# Patient Record
Sex: MALE | Race: WHITE | Employment: FULL TIME | URBAN - NONMETROPOLITAN AREA
[De-identification: names, ages, dates, MRNs, and addresses within clinical notes are randomized per-mention and may not be internally consistent; named-entity substitution may affect disease eponyms.]

---

## 2021-06-03 ENCOUNTER — OFFICE VISIT (OUTPATIENT)
Dept: PRIMARY CARE CLINIC | Age: 36
End: 2021-06-03
Payer: COMMERCIAL

## 2021-06-03 VITALS
HEART RATE: 74 BPM | HEIGHT: 72 IN | DIASTOLIC BLOOD PRESSURE: 78 MMHG | OXYGEN SATURATION: 98 % | TEMPERATURE: 97.3 F | BODY MASS INDEX: 30.51 KG/M2 | SYSTOLIC BLOOD PRESSURE: 118 MMHG | WEIGHT: 225.25 LBS

## 2021-06-03 DIAGNOSIS — N45.1 EPIDIDYMITIS: ICD-10-CM

## 2021-06-03 DIAGNOSIS — F41.9 ANXIETY AND DEPRESSION: ICD-10-CM

## 2021-06-03 DIAGNOSIS — G25.81 RESTLESS LEGS SYNDROME: ICD-10-CM

## 2021-06-03 DIAGNOSIS — F32.A ANXIETY AND DEPRESSION: ICD-10-CM

## 2021-06-03 DIAGNOSIS — Z00.00 VISIT FOR PREVENTIVE HEALTH EXAMINATION: ICD-10-CM

## 2021-06-03 DIAGNOSIS — Z11.59 ENCOUNTER FOR HEPATITIS C SCREENING TEST FOR LOW RISK PATIENT: ICD-10-CM

## 2021-06-03 DIAGNOSIS — Z13.220 SCREENING FOR LIPID DISORDERS: ICD-10-CM

## 2021-06-03 DIAGNOSIS — Z11.4 SCREENING FOR HIV (HUMAN IMMUNODEFICIENCY VIRUS): ICD-10-CM

## 2021-06-03 DIAGNOSIS — Z00.00 VISIT FOR PREVENTIVE HEALTH EXAMINATION: Primary | ICD-10-CM

## 2021-06-03 DIAGNOSIS — R53.83 FATIGUE, UNSPECIFIED TYPE: ICD-10-CM

## 2021-06-03 DIAGNOSIS — K21.9 GASTROESOPHAGEAL REFLUX DISEASE, UNSPECIFIED WHETHER ESOPHAGITIS PRESENT: ICD-10-CM

## 2021-06-03 LAB
ALBUMIN SERPL-MCNC: 4.4 G/DL (ref 3.5–5.2)
ALP BLD-CCNC: 72 U/L (ref 40–130)
ALT SERPL-CCNC: 26 U/L (ref 5–41)
ANION GAP SERPL CALCULATED.3IONS-SCNC: 12 MMOL/L (ref 7–19)
AST SERPL-CCNC: 28 U/L (ref 5–40)
BASOPHILS ABSOLUTE: 0 K/UL (ref 0–0.2)
BASOPHILS RELATIVE PERCENT: 0.3 % (ref 0–1)
BILIRUB SERPL-MCNC: 0.3 MG/DL (ref 0.2–1.2)
BUN BLDV-MCNC: 17 MG/DL (ref 6–20)
CALCIUM SERPL-MCNC: 9.9 MG/DL (ref 8.6–10)
CHLORIDE BLD-SCNC: 100 MMOL/L (ref 98–111)
CHOLESTEROL, TOTAL: 218 MG/DL (ref 160–199)
CO2: 24 MMOL/L (ref 22–29)
CREAT SERPL-MCNC: 1 MG/DL (ref 0.5–1.2)
EOSINOPHILS ABSOLUTE: 0.2 K/UL (ref 0–0.6)
EOSINOPHILS RELATIVE PERCENT: 2.7 % (ref 0–5)
GFR AFRICAN AMERICAN: >59
GFR NON-AFRICAN AMERICAN: >60
GLUCOSE BLD-MCNC: 97 MG/DL (ref 74–109)
HCT VFR BLD CALC: 47.5 % (ref 42–52)
HDLC SERPL-MCNC: 31 MG/DL (ref 55–121)
HEMOGLOBIN: 15.7 G/DL (ref 14–18)
HEPATITIS C ANTIBODY INTERPRETATION: NORMAL
HIV-1 P24 AG: NORMAL
IMMATURE GRANULOCYTES #: 0 K/UL
LDL CHOLESTEROL CALCULATED: 153 MG/DL
LYMPHOCYTES ABSOLUTE: 1.9 K/UL (ref 1.1–4.5)
LYMPHOCYTES RELATIVE PERCENT: 27.9 % (ref 20–40)
MCH RBC QN AUTO: 31 PG (ref 27–31)
MCHC RBC AUTO-ENTMCNC: 33.1 G/DL (ref 33–37)
MCV RBC AUTO: 93.9 FL (ref 80–94)
MONOCYTES ABSOLUTE: 0.5 K/UL (ref 0–0.9)
MONOCYTES RELATIVE PERCENT: 7.5 % (ref 0–10)
NEUTROPHILS ABSOLUTE: 4.1 K/UL (ref 1.5–7.5)
NEUTROPHILS RELATIVE PERCENT: 61.2 % (ref 50–65)
PDW BLD-RTO: 13.2 % (ref 11.5–14.5)
PLATELET # BLD: 225 K/UL (ref 130–400)
PMV BLD AUTO: 9.6 FL (ref 9.4–12.4)
POTASSIUM SERPL-SCNC: 5 MMOL/L (ref 3.5–5)
RAPID HIV 1&2: NORMAL
RBC # BLD: 5.06 M/UL (ref 4.7–6.1)
SODIUM BLD-SCNC: 136 MMOL/L (ref 136–145)
T4 FREE: 1.19 NG/DL (ref 0.93–1.7)
TOTAL PROTEIN: 7.6 G/DL (ref 6.6–8.7)
TRIGL SERPL-MCNC: 168 MG/DL (ref 0–149)
TSH SERPL DL<=0.05 MIU/L-ACNC: 0.89 UIU/ML (ref 0.27–4.2)
VITAMIN B-12: 536 PG/ML (ref 211–946)
WBC # BLD: 6.7 K/UL (ref 4.8–10.8)

## 2021-06-03 PROCEDURE — 99385 PREV VISIT NEW AGE 18-39: CPT | Performed by: PEDIATRICS

## 2021-06-03 RX ORDER — SERTRALINE HYDROCHLORIDE 100 MG/1
100 TABLET, FILM COATED ORAL DAILY
Qty: 90 TABLET | Refills: 3 | Status: SHIPPED | OUTPATIENT
Start: 2021-06-03 | End: 2021-07-02 | Stop reason: ALTCHOICE

## 2021-06-03 RX ORDER — CIPROFLOXACIN 500 MG/1
500 TABLET, FILM COATED ORAL 2 TIMES DAILY
Qty: 28 TABLET | Refills: 0 | Status: SHIPPED | OUTPATIENT
Start: 2021-06-03 | End: 2021-06-17

## 2021-06-03 RX ORDER — PANTOPRAZOLE SODIUM 40 MG/1
40 TABLET, DELAYED RELEASE ORAL DAILY
Qty: 90 TABLET | Refills: 3 | Status: SHIPPED | OUTPATIENT
Start: 2021-06-03 | End: 2022-08-01

## 2021-06-03 RX ORDER — SERTRALINE HYDROCHLORIDE 100 MG/1
1 TABLET, FILM COATED ORAL DAILY
COMMUNITY
Start: 2021-05-25 | End: 2021-06-03 | Stop reason: SDUPTHER

## 2021-06-03 RX ORDER — ROPINIROLE 0.5 MG/1
0.5 TABLET, FILM COATED ORAL 3 TIMES DAILY
Qty: 90 TABLET | Refills: 3 | Status: SHIPPED | OUTPATIENT
Start: 2021-06-03 | End: 2021-07-02 | Stop reason: ALTCHOICE

## 2021-06-03 RX ORDER — BUSPIRONE HYDROCHLORIDE 10 MG/1
1 TABLET ORAL 2 TIMES DAILY
COMMUNITY
Start: 2021-05-11 | End: 2021-06-03 | Stop reason: SDUPTHER

## 2021-06-03 RX ORDER — BUSPIRONE HYDROCHLORIDE 10 MG/1
10 TABLET ORAL 2 TIMES DAILY
Qty: 180 TABLET | Refills: 3 | Status: SHIPPED | OUTPATIENT
Start: 2021-06-03 | End: 2022-05-31

## 2021-06-03 RX ORDER — PANTOPRAZOLE SODIUM 40 MG/1
1 TABLET, DELAYED RELEASE ORAL DAILY
COMMUNITY
Start: 2021-04-06 | End: 2021-06-03 | Stop reason: SDUPTHER

## 2021-06-03 ASSESSMENT — PATIENT HEALTH QUESTIONNAIRE - PHQ9
SUM OF ALL RESPONSES TO PHQ9 QUESTIONS 1 & 2: 2
2. FEELING DOWN, DEPRESSED OR HOPELESS: 1
SUM OF ALL RESPONSES TO PHQ QUESTIONS 1-9: 2
1. LITTLE INTEREST OR PLEASURE IN DOING THINGS: 1
SUM OF ALL RESPONSES TO PHQ QUESTIONS 1-9: 2
SUM OF ALL RESPONSES TO PHQ QUESTIONS 1-9: 2

## 2021-06-03 ASSESSMENT — ENCOUNTER SYMPTOMS
SHORTNESS OF BREATH: 0
WHEEZING: 0
NAUSEA: 0
DIARRHEA: 0
ALLERGIC/IMMUNOLOGIC NEGATIVE: 1
CHEST TIGHTNESS: 0
RHINORRHEA: 0
BACK PAIN: 0
SINUS PRESSURE: 0
SORE THROAT: 0
ROS SKIN COMMENTS: NO NEW OR SUSPICIOUS SKIN LESIONS
COUGH: 0
CONSTIPATION: 0
VOMITING: 0
ABDOMINAL PAIN: 0
TROUBLE SWALLOWING: 0

## 2021-06-03 NOTE — PROGRESS NOTES
[address] [facility], [address]  Phone [phone]   Fax [phone]      OFFICE VISIT: 6/3/2021    Edgardo Mane-: 1985      HPI  Reason For Visit:  Siena Ramirez is a 38 y.o. 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 ), Mass (bumps in left underarm and lower left abdomin, no pain. He noticed the one on his arm several years ago, abdomin within the last year. ), and Medication Refill (zoloft, protonix, buspar)    Patient presents to [facility]. Present concerns:  He is refills of several medications. He also notes he has several bumps in his left axilla and left lower abdominal area. These been present for several years and have been stable but they seem to be increasing in the number of them present. These are not painful  These are soft and rubbery. They are located in the subcutaneous tissue      Anxiety and depression:  Medication:   Zoloft 100 mg daily   BuSpar 10 mg twice daily as needed  Symptoms: He feels that this regimen is relatively effective at managing his anxiety and depression symptoms. He would like to continue the same regimen      GERD:  Medication:   Protonix 40 mg daily  Symptoms: He is pleased with the Protonix and has not had any acid reflux symptoms      He has not had a physical in a couple yrs. He is due for a physical exam today      He is trying to lose weight. He is already lost about 50 pounds over the past several months. He has been dieting using a high-protein low carbohydrate diet. He has not been exercising      He is very tired all the time. He has daytime hypersomnolence. He is a restless sleeper. He has restless legs. He does not feel refreshed in the morning. He does snore some. height is 6' (1.829 m) and weight is 225 lb 4 oz (102.2 kg). His temporal temperature is 97.3 °F (36.3 °C). His blood pressure is 118/78 and his pulse is 74. His oxygen saturation is 98%.       Body mass index is 30.55 kg/m².    I have reviewed the following with the Mr. Mane   Lab Review  No results found for any previous visit. Copies of these are in the chart. Current Outpatient Medications   Medication Sig Dispense Refill    busPIRone (BUSPAR) 10 MG tablet Take 1 tablet by mouth 2 times daily 180 tablet 3    sertraline (ZOLOFT) 100 MG tablet Take 1 tablet by mouth daily 90 tablet 3    pantoprazole (PROTONIX) 40 MG tablet Take 1 tablet by mouth daily 90 tablet 3    rOPINIRole (REQUIP) 0.5 MG tablet Take 1 tablet by mouth 3 times daily 90 tablet 3    ciprofloxacin (CIPRO) 500 MG tablet Take 1 tablet by mouth 2 times daily for 14 days 28 tablet 0     No current facility-administered medications for this visit. Allergies: Patient has no known allergies. History reviewed. No pertinent past medical history. Family History   Problem Relation Age of Onset    Colon Cancer Paternal Grandmother        History reviewed. No pertinent surgical history. Social History     Tobacco Use    Smoking status: Never Smoker    Smokeless tobacco: Never Used   Substance Use Topics    Alcohol use: Not Currently        Review of Systems   Constitutional: Negative for appetite change, chills, diaphoresis, fatigue, fever and unexpected weight change. HENT: Positive for congestion. Negative for dental problem (following with dentist regularly), ear pain, hearing loss, postnasal drip, rhinorrhea, sinus pressure, sore throat, tinnitus and trouble swallowing. Eyes: Negative for visual disturbance (following with regular eye exams). Respiratory: Negative for cough, chest tightness, shortness of breath and wheezing. No orthopnea   Cardiovascular: Negative for chest pain (or pressure), palpitations and leg swelling. Gastrointestinal: Negative for abdominal pain, constipation, diarrhea, nausea and vomiting.         No melena or hematochezia   Endocrine: Negative for cold intolerance, heat intolerance, polydipsia and polyuria. Genitourinary: Negative for difficulty urinating, dysuria and enuresis. Musculoskeletal: Negative for arthralgias, back pain, joint swelling and myalgias. Skin: Negative for rash. No new or suspicious skin lesions   Allergic/Immunologic: Negative. Neurological: Negative for dizziness, tremors, syncope (or presyncope), weakness, light-headedness and headaches. Hematological: Negative for adenopathy. Does not bruise/bleed easily. Psychiatric/Behavioral: Negative. Physical Exam  Constitutional:       General: He is not in acute distress. Appearance: Normal appearance. He is well-developed. Comments: Mild ob   HENT:      Head: Normocephalic and atraumatic. Right Ear: Tympanic membrane, ear canal and external ear normal.      Left Ear: Tympanic membrane, ear canal and external ear normal.      Nose: Nose normal.      Mouth/Throat:      Mouth: Mucous membranes are moist.      Pharynx: Oropharynx is clear. Eyes:      General: No scleral icterus. Extraocular Movements: Extraocular movements intact. Conjunctiva/sclera: Conjunctivae normal.      Pupils: Pupils are equal, round, and reactive to light. Neck:      Thyroid: No thyromegaly. Vascular: No carotid bruit or JVD. Cardiovascular:      Rate and Rhythm: Normal rate and regular rhythm. No extrasystoles are present. Chest Wall: PMI is not displaced. Pulses: Normal pulses. Heart sounds: Normal heart sounds, S1 normal and S2 normal. No murmur heard. No friction rub. No gallop. Pulmonary:      Effort: Pulmonary effort is normal. No respiratory distress. Breath sounds: Normal breath sounds. No wheezing, rhonchi or rales. Abdominal:      General: Bowel sounds are normal.      Palpations: Abdomen is soft. Tenderness: There is no abdominal tenderness. There is no guarding or rebound.    Genitourinary:     Comments: Exam deferred  Musculoskeletal:         General: No tenderness. Normal range of motion. Cervical back: Normal range of motion and neck supple. Lymphadenopathy:      Cervical: No cervical adenopathy. Skin:     General: Skin is warm and dry. Capillary Refill: Capillary refill takes less than 2 seconds. Findings: No rash. Neurological:      General: No focal deficit present. Mental Status: He is alert and oriented to person, place, and time. Sensory: No sensory deficit (no numbness or tingling). Psychiatric:         Mood and Affect: Mood normal.         Behavior: Behavior normal.         ASSESSMENT      ICD-10-CM    1. Visit for preventive health examination  Z00.00 CBC Auto Differential     Comprehensive Metabolic Panel     Lipid Panel     T4, Free     TSH without Reflex     Hepatitis C Antibody     HIV Rapid 1&2   2. Anxiety and depression  F41.9 CBC Auto Differential    F32.9 Comprehensive Metabolic Panel     T4, Free     TSH without Reflex     busPIRone (BUSPAR) 10 MG tablet     sertraline (ZOLOFT) 100 MG tablet   3. Gastroesophageal reflux disease, unspecified whether esophagitis present  K21.9 CBC Auto Differential     Comprehensive Metabolic Panel     pantoprazole (PROTONIX) 40 MG tablet   4. Screening for HIV (human immunodeficiency virus)  Z11.4 HIV Rapid 1&2   5. Encounter for hepatitis C screening test for low risk patient  Z11.59 Hepatitis C Antibody   6. Screening for lipid disorders  Z13.220 Lipid Panel   7. Restless legs syndrome  G25.81 rOPINIRole (REQUIP) 0.5 MG tablet   8. Epididymitis  N45.1 ciprofloxacin (CIPRO) 500 MG tablet   9. Fatigue, unspecified type  R53.83 T4, Free     TSH without Reflex     Vitamin B12         PLAN    1. Visit for preventive health examination  Routine age-appropriate anticipatory guidance was provided. Annual wellness visit was performed and issues were addressed as identified.   - CBC Auto Differential; Future  - Comprehensive Metabolic Panel; Future  - Lipid Panel;  Future  - T4, Free; Future  - TSH without Reflex; Future  - Hepatitis C Antibody; Future  - HIV Rapid 1&2; Future    2. Anxiety and depression  We will continue with his Zoloft and BuSpar as before. He does need refills of these medications today  - CBC Auto Differential; Future  - Comprehensive Metabolic Panel; Future  - T4, Free; Future  - TSH without Reflex; Future  - busPIRone (BUSPAR) 10 MG tablet; Take 1 tablet by mouth 2 times daily  Dispense: 180 tablet; Refill: 3  - sertraline (ZOLOFT) 100 MG tablet; Take 1 tablet by mouth daily  Dispense: 90 tablet; Refill: 3    3. Gastroesophageal reflux disease, unspecified whether esophagitis present  Well-controlled with Protonix. We will continue this medication as well  - CBC Auto Differential; Future  - Comprehensive Metabolic Panel; Future  - pantoprazole (PROTONIX) 40 MG tablet; Take 1 tablet by mouth daily  Dispense: 90 tablet; Refill: 3    4. Screening for HIV (human immunodeficiency virus)  Routine screening  Procedure and rationale for the procedure was explained  - HIV Rapid 1&2; Future    5. Encounter for hepatitis C screening test for low risk patient  Routine screening. Procedure and rationale for the procedure was explained  - Hepatitis C Antibody; Future    6. Screening for lipid disorders  Check lipids as baseline  - Lipid Panel; Future    7. Restless legs syndrome  We will treat him for restless leg syndrome. Should he not benefit from this medication regimen, we may consider doing a sleep study  - rOPINIRole (REQUIP) 0.5 MG tablet; Take 1 tablet by mouth 3 times daily  Dispense: 90 tablet; Refill: 3    8. Epididymitis  This was suspected as an etiology however he did not have as significant of tenderness as I would have anticipated. We can treat this as an optional treatment. If symptoms do not improve I would go ahead and take the medication. - ciprofloxacin (CIPRO) 500 MG tablet; Take 1 tablet by mouth 2 times daily for 14 days  Dispense: 28 tablet;  Refill:

## 2021-06-03 NOTE — PATIENT INSTRUCTIONS
Patient Education        Learning About Anxiety Disorders  What are anxiety disorders? Anxiety disorders are a type of medical problem. They cause severe anxiety. When you feel anxious, you feel that something bad is about to happen. This feeling interferes with your life. These disorders include:  · Generalized anxiety disorder. You feel worried and stressed about many everyday events and activities. This goes on for several months and disrupts your life on most days. · Panic disorder. You have repeated panic attacks. A panic attack is a sudden, intense fear or anxiety. It may make you feel short of breath. Your heart may pound. · Social anxiety disorder. You feel very anxious about what you will say or do in front of people. For example, you may be scared to talk or eat in public. This problem affects your daily life. · Phobias. You are very scared of a specific object, situation, or activity. For example, you may fear spiders, high places, or small spaces. What are the symptoms? Generalized anxiety disorder  Symptoms may include:  · Feeling worried and stressed about many things almost every day. · Feeling tired or irritable. You may have a hard time concentrating. · Having headaches or muscle aches. · Having a hard time getting to sleep or staying asleep. Panic disorder  You may have repeated panic attacks when there is no reason for feeling afraid. You may change your daily activities because you worry that you will have another attack. Symptoms may include:  · Intense fear, terror, or anxiety. · Trouble breathing or very fast breathing. · Chest pain or tightness. · A heartbeat that races or is not regular. Social anxiety disorder  Symptoms may include:  · Fear about a social situation, such as eating in front of others or speaking in public. You may worry a lot. Or you may be afraid that something bad will happen. · Anxiety that can cause you to blush, sweat, and feel shaky.   · A heartbeat that is faster than normal.  · A hard time focusing. Phobias  Symptoms may include:  · More fear than most people of being around an object, being in a situation, or doing an activity. You might also be stressed about the chance of being around the thing you fear. · Worry about losing control, panicking, fainting, or having physical symptoms like a faster heartbeat when you are around the situation or object. How are these disorders treated? Anxiety disorders can be treated with medicines or counseling. A combination of both may be used. Medicines may include:  · Antidepressants. These may help your symptoms by keeping chemicals in your brain in balance. · Benzodiazepines. These may give you short-term relief of your symptoms. Some people use cognitive-behavioral therapy. A therapist helps you learn to change stressful or bad thoughts into helpful thoughts. Lead a healthy lifestyle  A healthy lifestyle may help you feel better. · Get at least 30 minutes of exercise on most days of the week. Walking is a good choice. · Eat a healthy diet. Include fruits, vegetables, lean proteins, and whole grains in your diet each day. · Try to go to bed at the same time every night. Try for 8 hours of sleep a night. · Find ways to manage stress. Try relaxation exercises. · Avoid alcohol and illegal drugs. Follow-up care is a key part of your treatment and safety. Be sure to make and go to all appointments, and call your doctor if you are having problems. It's also a good idea to know your test results and keep a list of the medicines you take. Where can you learn more? Go to https://christiano.[employer]. org and sign in to your WireImage account. Enter D103 in the [employer]Rocket box to learn more about \"Learning About Anxiety Disorders. \"     If you do not have an account, please click on the \"Sign Up Now\" link.   Current as of: September 23, 2020               Content Version: 12.8  © as antidepressants, antihistamines, and cold and sinus medicines, can make your symptoms worse. · Avoid caffeine products, such as coffee, tea, cola, and chocolate. Caffeine can interrupt your sleep and stimulate you. · Do not smoke. Nicotine can make restless legs worse. If you need help quitting, talk to your doctor about stop-smoking programs and medicines. These can increase your chances of quitting for good. · Do not drink alcohol late in the evening. Take steps to help you sleep better  · Get plenty of sunlight in the outdoors, particularly later in the afternoon. · Use the evening hours for settling down. Avoid activities that challenge you in the hours before bedtime. · Eat meals at regular times, and do not snack before bedtime. · Keep your bedroom quiet, dark, and cool. Try using a sleep mask and earplugs to help you sleep. · Limit how much you drink at night to reduce your need to get up to urinate. But do not go to bed thirsty. · Run a fan or other steady \"white noise\" during the night if noises wake you up. · Simpson the bed for sleeping and sex. Do your reading or TV watching in another room. · Once you are in bed, relax from head to toe, and guide your mind to pleasant thoughts. · Do not stay in bed longer than 8 hours, and try to avoid naps. When should you call for help? Watch closely for changes in your health, and be sure to contact your doctor if:    · You are still not getting enough sleep.     · Your symptoms become more severe or happen more often. Where can you learn more? Go to https://Jiraferenato.Meeps. org and sign in to your LifeShield Security account. Enter G159 in the [facility] box to learn more about \"Restless Legs Syndrome: Care Instructions. \"     If you do not have an account, please click on the \"Sign Up Now\" link. Current as of: August 4, 2020               Content Version: 12.8  © 7315-9870 Healthwise, Incorporated.    Care instructions adapted under license by [facility] ([facility]). If you have questions about a medical condition or this instruction, always ask your healthcare professional. [address] any warranty or liability for your use of this information. Patient Education        Sleep Apnea: Care Instructions  Overview     Sleep apnea means that you frequently stop breathing for 10 seconds or longer during sleep. It can be mild to severe, based on the number of times an hour that you stop breathing or have slowed breathing. Blocked or narrowed airways in your nose, mouth, or throat can cause sleep apnea. Your airway can become blocked when your throat muscles and tongue relax during sleep. You can help treat sleep apnea at home by making lifestyle changes. You also can use a CPAP breathing machine that keeps tissues in the throat from blocking your airway. Or your doctor may suggest that you use a breathing device while you sleep. It helps keep your airway open. This could be a device that you put in your mouth. In some cases, surgery may be needed to remove enlarged tissues in the throat. Follow-up care is a key part of your treatment and safety. Be sure to make and go to all appointments, and call your doctor if you are having problems. It's also a good idea to know your test results and keep a list of the medicines you take. How can you care for yourself at home? · Lose weight, if needed. · Sleep on your side. It may help mild apnea. · Avoid alcohol and medicines such as sleeping pills, opioids, or sedatives before bed. · Don't smoke. If you need help quitting, talk to your doctor. · Prop up the head of your bed. · Treat breathing problems, such as a stuffy nose, that are caused by a cold or allergies. · Try a continuous positive airway pressure (CPAP) breathing machine if your doctor recommends it. · If CPAP doesn't work for you, ask your doctor if you can try other masks, settings, or breathing machines.   · Try oral or more times an hour (severe apnea). Obstructive sleep apnea is the most common type. This most often occurs because your airways are blocked or partly blocked. Central sleep apnea is less common. It happens when the brain has trouble controlling breathing. Some people have both types. That's called complex sleep apnea. What are the symptoms? There are symptoms of sleep apnea that you may notice and symptoms that others may notice when you're asleep. Symptoms you may notice include:  · Feeling extremely sleepy during the day. · Feeling unrefreshed or tired after a night's sleep. · Problems with memory and concentration, or mood changes. · Morning headaches. · Getting up often during the night to urinate. · A dry mouth or sore throat in the morning. Your bed partner may notice that you:  · Have episodes of not breathing. · Snore loudly. Almost all people who have sleep apnea snore. But not all people who snore have sleep apnea. · Toss and turn during sleep. · Have nighttime choking or gasping spells. How is it diagnosed? Your doctor will probably do a physical exam and ask about your past health. The doctor may also ask you or your bed partner about your snoring and sleep behavior and how tired you feel during the day. Your doctor may suggest a sleep study. Sleep studies are a series of tests that look at what happens to the body during sleep. They check for how often you stop breathing or have too little air flowing into your lungs during sleep. They also find out how much oxygen you have in your blood during sleep. A sleep study may take place in your home. Or it might take place at a sleep center, where you will spend the night. If your sleep apnea doesn't improve with treatment, you may have more tests to find out what's causing it. How is it treated? You may be able to help treat sleep apnea by making some lifestyle changes.  You could try to lose weight, sleep on your side, and avoid alcohol and medicines like sedatives before bed. Sleep apnea is often treated with machines that deliver air through a mask to help keep your airways open. These include:  · Continuous positive airway pressure (CPAP). This increases air pressure in your throat. It keeps your airway open when you breathe in. It's the most common device. · Bilevel positive airway pressure (BiPAP). This uses different air pressures when you breathe in and out. · Adaptive servo ventilation (ASV). It senses pauses in breathing and adjusts air pressure. It's mostly used for central sleep apnea. You can also try oral breathing devices or nasal devices. If your tonsils or other tissues are blocking your airway, your doctor may suggest surgery to open the airway. How can you care for yourself at home? · Lose weight, if needed. · Sleep on your side. It may help mild apnea. · Avoid alcohol and medicines such as sleeping pills, opioids, or sedatives before bed. · Don't smoke. If you need help quitting, talk to your doctor. · Prop up the head of your bed. · Treat breathing problems, such as a stuffy nose, that are caused by a cold or allergies. · Try a continuous positive airway pressure (CPAP) breathing machine if your doctor recommends it. · If CPAP doesn't work for you, ask your doctor if you can try other masks, settings, or breathing machines. · Try oral breathing devices or other nasal devices. · Talk to your doctor if your nose feels dry or bleeds, or if it gets runny or stuffy when you use a breathing machine. · Tell your doctor if you're sleepy during the day and it affects your daily life. Don't drive or operate machinery when you're drowsy. Where can you learn more? Go to https://Hot Hotels.[employer]. org and sign in to your Xray Im[employer]k account. Enter S121 in the [employer] box to learn more about \"Learning About Sleep Apnea. \"     If you do not have an account, please click on the \"Sign Up Now\" link.   Current as of: October 26, 2020               Content Version: 12.8  © 1569-0411 Healthwise, Incorporated. Care instructions adapted under license by [facility] ([facility]). If you have questions about a medical condition or this instruction, always ask your healthcare professional. [address] any warranty or liability for your use of this information.

## 2021-06-07 ENCOUNTER — TELEPHONE (OUTPATIENT)
Dept: PRIMARY CARE CLINIC | Age: 36
End: 2021-06-07

## 2021-06-07 DIAGNOSIS — E78.2 MIXED HYPERLIPIDEMIA: Primary | ICD-10-CM

## 2021-06-07 RX ORDER — ATORVASTATIN CALCIUM 20 MG/1
20 TABLET, FILM COATED ORAL DAILY
Qty: 30 TABLET | Refills: 5 | Status: SHIPPED | OUTPATIENT
Start: 2021-06-07 | End: 2021-11-29

## 2021-06-07 NOTE — TELEPHONE ENCOUNTER
----- Message from Shanon Gilman DO sent at 6/4/2021  7:06 PM CDT -----  Your metabolic profile is normal.  This includes kidney and liver functions as well as electrolytes. Lipids are significantly elevated. This is likely higher than what you can get down by diet and exercise alone. Recommend atorvastatin 20 mg nightly. Dispense #30 with 11 refills. Recheck lipids and ALT in 4 to 6 weeks. HIV is nonreactive, meaning normal.Thyroid values normal.Vitamin B12 level is normal.Hep C antibody is nonreactive, meaning normal.Your WBC, (infection fighting ability) Hgb and Hct, (oxygen carrying cells) are normal; as is your percentage of each cell type.

## 2021-07-02 ENCOUNTER — OFFICE VISIT (OUTPATIENT)
Dept: PRIMARY CARE CLINIC | Age: 36
End: 2021-07-02
Payer: COMMERCIAL

## 2021-07-02 VITALS
SYSTOLIC BLOOD PRESSURE: 96 MMHG | TEMPERATURE: 97.3 F | HEART RATE: 74 BPM | OXYGEN SATURATION: 98 % | HEIGHT: 72 IN | BODY MASS INDEX: 29.39 KG/M2 | DIASTOLIC BLOOD PRESSURE: 66 MMHG | WEIGHT: 217 LBS

## 2021-07-02 DIAGNOSIS — I95.1 ORTHOSTASIS: ICD-10-CM

## 2021-07-02 DIAGNOSIS — G47.19 DAYTIME HYPERSOMNOLENCE: ICD-10-CM

## 2021-07-02 DIAGNOSIS — F33.1 MODERATE EPISODE OF RECURRENT MAJOR DEPRESSIVE DISORDER (HCC): ICD-10-CM

## 2021-07-02 DIAGNOSIS — F41.9 ANXIETY: Primary | ICD-10-CM

## 2021-07-02 DIAGNOSIS — E78.2 MIXED HYPERLIPIDEMIA: ICD-10-CM

## 2021-07-02 PROCEDURE — 99214 OFFICE O/P EST MOD 30 MIN: CPT | Performed by: PEDIATRICS

## 2021-07-02 RX ORDER — FLUOXETINE HYDROCHLORIDE 40 MG/1
40 CAPSULE ORAL DAILY
Qty: 30 CAPSULE | Refills: 3 | Status: SHIPPED | OUTPATIENT
Start: 2021-07-02 | End: 2021-08-06 | Stop reason: SDUPTHER

## 2021-07-02 SDOH — ECONOMIC STABILITY: FOOD INSECURITY: WITHIN THE PAST 12 MONTHS, YOU WORRIED THAT YOUR FOOD WOULD RUN OUT BEFORE YOU GOT MONEY TO BUY MORE.: NEVER TRUE

## 2021-07-02 SDOH — ECONOMIC STABILITY: FOOD INSECURITY: WITHIN THE PAST 12 MONTHS, THE FOOD YOU BOUGHT JUST DIDN'T LAST AND YOU DIDN'T HAVE MONEY TO GET MORE.: NEVER TRUE

## 2021-07-02 ASSESSMENT — ENCOUNTER SYMPTOMS
TROUBLE SWALLOWING: 0
DIARRHEA: 0
VOMITING: 0
RHINORRHEA: 0
WHEEZING: 0
ALLERGIC/IMMUNOLOGIC NEGATIVE: 1
ROS SKIN COMMENTS: NO NEW OR SUSPICIOUS SKIN LESIONS
SINUS PRESSURE: 0
COUGH: 0
ABDOMINAL PAIN: 0
SHORTNESS OF BREATH: 0
NAUSEA: 0
CHEST TIGHTNESS: 0
SORE THROAT: 0
CONSTIPATION: 0
BACK PAIN: 0

## 2021-07-02 ASSESSMENT — SOCIAL DETERMINANTS OF HEALTH (SDOH): HOW HARD IS IT FOR YOU TO PAY FOR THE VERY BASICS LIKE FOOD, HOUSING, MEDICAL CARE, AND HEATING?: NOT HARD AT ALL

## 2021-07-02 NOTE — PROGRESS NOTES
Bharath Reyes [address] [place], [address]  Phone [phone]   Fax [phone]      OFFICE VISIT: 2021    Jac Mane-: 1985      HPI  Reason For Visit:  Fazal Torres is a 38 y.o.     1 Month Follow-Up, Insomnia (has not noticed any difference since starting requip, he is still tossing and turning all night. Feels drowsy all throughout the day. ), and Dizziness (spells of dizziness when standing up since starting requip and lipitor last month. )    Patient presents on follow-up for insomnia. He was last evaluated on 6/3/2021  We did diagnose him with restless leg syndrome and started Requip 0.5 mg nightly. This is not really helped much at all. He is still tossing and turning through the night and feels very tired throughout the day with hypersomnolence. There was also suspicion that he may have underlying sleep apnea. A sleep study has not been ordered as of yet. He also complains of some dizziness with postural changes. He is not on any antihypertensive agents. Blood pressure today is low at 96/66.     height is 6' (1.829 m) and weight is 217 lb (98.4 kg). His temporal temperature is 97.3 °F (36.3 °C). His blood pressure is 96/66 and his pulse is 74. His oxygen saturation is 98%. Body mass index is 29.43 kg/m². I have reviewed the following with the Mr. Mane   Lab Review  Orders Only on 2021   Component Date Value    Vitamin B-12 2021 536     Rapid HIV 1&2 2021 Non-reactive     HIV-1 P24 Ag 2021 Non-reactive     Hep C Ab Interp 2021 Non-Reactive     TSH 2021 0.886     T4 Free 2021 1.19     Cholesterol, Total 2021 218*    Triglycerides 2021 168*    HDL 2021 31*    LDL Calculated 2021 153     Sodium 2021 136     Potassium 2021 5.0     Chloride 2021 100     CO2 2021 24     Anion Gap 2021 12     Glucose 2021 97     BUN 2021 17     CREATININE 06/03/2021 1.0     GFR Non- 06/03/2021 >60     GFR  06/03/2021 >59     Calcium 06/03/2021 9.9     Total Protein 06/03/2021 7.6     Albumin 06/03/2021 4.4     Total Bilirubin 06/03/2021 0.3     Alkaline Phosphatase 06/03/2021 72     ALT 06/03/2021 26     AST 06/03/2021 28     WBC 06/03/2021 6.7     RBC 06/03/2021 5.06     Hemoglobin 06/03/2021 15.7     Hematocrit 06/03/2021 47.5     MCV 06/03/2021 93.9     MCH 06/03/2021 31.0     MCHC 06/03/2021 33.1     RDW 06/03/2021 13.2     Platelets 60/35/0246 225     MPV 06/03/2021 9.6     Neutrophils % 06/03/2021 61.2     Lymphocytes % 06/03/2021 27.9     Monocytes % 06/03/2021 7.5     Eosinophils % 06/03/2021 2.7     Basophils % 06/03/2021 0.3     Neutrophils Absolute 06/03/2021 4.1     Immature Granulocytes # 06/03/2021 0.0     Lymphocytes Absolute 06/03/2021 1.9     Monocytes Absolute 06/03/2021 0.50     Eosinophils Absolute 06/03/2021 0.20     Basophils Absolute 06/03/2021 0.00      Copies of these are in the chart. Current Outpatient Medications   Medication Sig Dispense Refill    FLUoxetine (PROZAC) 40 MG capsule Take 1 capsule by mouth daily 30 capsule 3    atorvastatin (LIPITOR) 20 MG tablet Take 1 tablet by mouth daily 30 tablet 5    busPIRone (BUSPAR) 10 MG tablet Take 1 tablet by mouth 2 times daily 180 tablet 3    pantoprazole (PROTONIX) 40 MG tablet Take 1 tablet by mouth daily 90 tablet 3     No current facility-administered medications for this visit. Allergies: Patient has no known allergies. No past medical history on file. Family History   Problem Relation Age of Onset    Colon Cancer Paternal Grandmother        No past surgical history on file.     Social History     Tobacco Use    Smoking status: Never Smoker    Smokeless tobacco: Never Used   Substance Use Topics    Alcohol use: Not Currently        Review of Systems   Constitutional: Negative for appetite change, chills, diaphoresis, fatigue, fever and unexpected weight change. HENT: Positive for congestion. Negative for dental problem (following with dentist regularly), ear pain, hearing loss, postnasal drip, rhinorrhea, sinus pressure, sore throat, tinnitus and trouble swallowing. Eyes: Negative for visual disturbance (following with regular eye exams). Respiratory: Negative for cough, chest tightness, shortness of breath and wheezing. No orthopnea   Cardiovascular: Negative for chest pain (or pressure), palpitations and leg swelling. Gastrointestinal: Negative for abdominal pain, constipation, diarrhea, nausea and vomiting. No melena or hematochezia   Endocrine: Negative for cold intolerance, heat intolerance, polydipsia and polyuria. Genitourinary: Negative for difficulty urinating, dysuria and enuresis. Musculoskeletal: Negative for arthralgias, back pain, joint swelling and myalgias. Skin: Negative for rash. No new or suspicious skin lesions   Allergic/Immunologic: Negative. Neurological: Negative for dizziness, tremors, syncope (or presyncope), weakness, light-headedness and headaches. Hematological: Negative for adenopathy. Does not bruise/bleed easily. Psychiatric/Behavioral: Negative. Physical Exam  Constitutional:       General: He is not in acute distress. Appearance: Normal appearance. He is well-developed. Comments: Mild ob   HENT:      Head: Normocephalic and atraumatic. Right Ear: Tympanic membrane, ear canal and external ear normal.      Left Ear: Tympanic membrane, ear canal and external ear normal.      Nose: Nose normal.      Mouth/Throat:      Mouth: Mucous membranes are moist.      Pharynx: Oropharynx is clear. Eyes:      General: No scleral icterus. Extraocular Movements: Extraocular movements intact. Conjunctiva/sclera: Conjunctivae normal.      Pupils: Pupils are equal, round, and reactive to light.    Neck: Thyroid: No thyromegaly. Vascular: No carotid bruit or JVD. Cardiovascular:      Rate and Rhythm: Normal rate and regular rhythm. No extrasystoles are present. Chest Wall: PMI is not displaced. Pulses: Normal pulses. Heart sounds: Normal heart sounds, S1 normal and S2 normal. No murmur heard. No friction rub. No gallop. Pulmonary:      Effort: Pulmonary effort is normal. No respiratory distress. Breath sounds: Normal breath sounds. No wheezing, rhonchi or rales. Abdominal:      General: Bowel sounds are normal.      Palpations: Abdomen is soft. Tenderness: There is no abdominal tenderness. There is no guarding or rebound. Genitourinary:     Comments: Exam deferred  Musculoskeletal:         General: No tenderness. Normal range of motion. Cervical back: Normal range of motion and neck supple. Lymphadenopathy:      Cervical: No cervical adenopathy. Skin:     General: Skin is warm and dry. Capillary Refill: Capillary refill takes less than 2 seconds. Findings: No rash. Neurological:      General: No focal deficit present. Mental Status: He is alert and oriented to person, place, and time. Sensory: No sensory deficit (no numbness or tingling). Psychiatric:         Mood and Affect: Mood normal.         Behavior: Behavior normal.             ASSESSMENT      ICD-10-CM    1. Anxiety  F41.9 FLUoxetine (PROZAC) 40 MG capsule   2. Moderate episode of recurrent major depressive disorder (HCC)  F33.1    3. Daytime hypersomnolence  G47.19 Home Sleep Study   4. Orthostasis  I95.1    5. Mixed hyperlipidemia  E78.2          PLAN    1. Anxiety  We are going to change from sertraline to fluoxetine. The rationale of these medications and plan were discussed at length. He does have an understanding of these medications as well as the options that were reviewed in the office today.  - FLUoxetine (PROZAC) 40 MG capsule;  Take 1 capsule by mouth daily  Dispense: 30 capsule; Refill: 3    2. Moderate episode of recurrent major depressive disorder ([address]  Hopefully fluoxetine will help with motivation and some psychomotor depression. At the same time we can control anxiety as this does have an indication for generalized anxiety disorder. He is aware that the initial increase in energy can be interpreted as anxiety and will be cautious not to allow that to happen. He can continue to utilize BuSpar on a as needed basis but should not take continually. 3. Daytime hypersomnolence  We are going to order home sleep study. The Requip did not seem to help at all with his restless legs. In fact were not sure whether he has restless legs or just restlessness at night. We are going to stop Requip and order a home sleep study. We will be able to tell if there is restless legs based upon this study finding  - Home Sleep Study; Future    4. Orthostasis  He is not on any antihypertensive medications. We are going to have him change his diet and stop his keto diet for now. We are going to switch to a more healthy Mediterranean diet. Hopefully he will equilibrate on this diet. We did discuss medications that could increase his blood pressure such as midodrine, however we are going to try to avoid these medications if possible. He will let me know if his symptoms do not improve. 5. Hyperlipidemia  We discussed the risk benefit of atorvastatin and lipid management. He has been on a high-protein high-fat diet. If he does wish to try to withhold his atorvastatin for a couple of months to see if this helps his symptoms, that is fine with me. I did advise him however that this was likely going to be a lifelong commitment to a medication as it is unlikely he will be able to get this down by diet and exercise alone. We will see what his baseline is though on a more normal diet instead of the very high fat diet he has been on.       Orders Placed This Encounter   Procedures   [facility] Sleep Study        Return in about 3 months (around 10/2/2021).      This was an in-house visit  Time 40+m

## 2021-07-30 ENCOUNTER — HOSPITAL ENCOUNTER (OUTPATIENT)
Dept: SLEEP CENTER | Age: 36
Discharge: HOME | End: 2021-08-01
Payer: COMMERCIAL

## 2021-07-30 PROCEDURE — G0399 HOME SLEEP TEST/TYPE 3 PORTA: HCPCS

## 2021-08-06 DIAGNOSIS — F41.9 ANXIETY: ICD-10-CM

## 2021-08-06 RX ORDER — FLUOXETINE HYDROCHLORIDE 40 MG/1
40 CAPSULE ORAL DAILY
Qty: 90 CAPSULE | Refills: 3 | Status: SHIPPED | OUTPATIENT
Start: 2021-08-06 | End: 2022-08-15

## 2021-08-26 DIAGNOSIS — G25.81 RESTLESS LEGS SYNDROME: ICD-10-CM

## 2021-08-27 RX ORDER — ROPINIROLE 0.5 MG/1
TABLET, FILM COATED ORAL
Qty: 270 TABLET | Refills: 1 | OUTPATIENT
Start: 2021-08-27

## 2021-08-27 NOTE — TELEPHONE ENCOUNTER
Received fax from pharmacy requesting refill on pts medication(s). Pt was last seen in office on 7/2/2021  and has a follow up scheduled for 10/4/2021.  I do not see this on pts current medication list.     Requested Prescriptions     Refused Prescriptions Disp Refills    rOPINIRole (REQUIP) 0.5 MG tablet [Pharmacy Med Name: ROPINIROLE HCL 0.5 MG TABLET] 270 tablet 1     Sig: TAKE 1 TABLET BY MOUTH THREE TIMES A DAY     Refused By: Yovanny Sanders     Reason for Refusal: Patient should contact provider first

## 2021-08-29 DIAGNOSIS — G47.33 SLEEP APNEA, OBSTRUCTIVE: Primary | ICD-10-CM

## 2021-08-29 PROCEDURE — 95806 SLEEP STUDY UNATT&RESP EFFT: CPT | Performed by: PSYCHIATRY & NEUROLOGY

## 2021-08-30 ENCOUNTER — TELEPHONE (OUTPATIENT)
Dept: SLEEP CENTER | Age: 36
End: 2021-08-30

## 2021-08-30 NOTE — PROGRESS NOTES
[place] [address]  [place], [address]  Phone [phone] Fax [phone]     Patient Name: Armani Hoang 2021  : 1985  Age: 38 y.o.   Patient Address: [address] [place] [address]       Patient Phone: [phone] (home)     REFERRAL  Referred to: DME provider of patient's choice  Armani Hoang is referred for the following:    DME Equipment HPCPS Code Setting   Auto Adjusting CPAP device with flex or comparable pressure relief per comfort  8cm to 18cm   Heated Humidifier  Patient Choice       Replinishible PAP Supplies, 1 year supply  Item HPCPS Code Frequency   Mask of choice  or  1 per 3 months   Nasal Mask cushion/pillows  or  2 per 30 days   Full Face Mask Interface  1 per 30 days   Headgear  1 per 6 months   Tubing, length of choice  or  1 per 3 months   Water Chamber  1 per 6 months   Chinstrap  1 per 6 months   Disposable Filters  2 per 30 days   Reusable Filters  1 per 6 months     Diagnoses:  Obstructive sleep apnea (G47.33)  Length of Need: Lifetime, 99    Ordering Provider: JOSE LUIS Fan: [phone]         Signature:       Date: 2021      Electronically Signed by Sakshi Cornejo M.D.

## 2021-10-04 ENCOUNTER — OFFICE VISIT (OUTPATIENT)
Dept: PRIMARY CARE CLINIC | Age: 36
End: 2021-10-04
Payer: COMMERCIAL

## 2021-10-04 VITALS
BODY MASS INDEX: 29.53 KG/M2 | TEMPERATURE: 97.8 F | OXYGEN SATURATION: 99 % | SYSTOLIC BLOOD PRESSURE: 106 MMHG | HEART RATE: 72 BPM | DIASTOLIC BLOOD PRESSURE: 64 MMHG | WEIGHT: 218 LBS | HEIGHT: 72 IN

## 2021-10-04 DIAGNOSIS — E78.2 MIXED HYPERLIPIDEMIA: ICD-10-CM

## 2021-10-04 DIAGNOSIS — F51.01 PRIMARY INSOMNIA: ICD-10-CM

## 2021-10-04 DIAGNOSIS — Z99.89 OBSTRUCTIVE SLEEP APNEA ON CPAP: ICD-10-CM

## 2021-10-04 DIAGNOSIS — F33.1 MODERATE EPISODE OF RECURRENT MAJOR DEPRESSIVE DISORDER (HCC): ICD-10-CM

## 2021-10-04 DIAGNOSIS — F41.9 ANXIETY: Primary | ICD-10-CM

## 2021-10-04 DIAGNOSIS — G47.33 OBSTRUCTIVE SLEEP APNEA ON CPAP: ICD-10-CM

## 2021-10-04 PROCEDURE — 99214 OFFICE O/P EST MOD 30 MIN: CPT | Performed by: PEDIATRICS

## 2021-10-04 RX ORDER — BUPROPION HYDROCHLORIDE 150 MG/1
150 TABLET ORAL EVERY MORNING
Qty: 30 TABLET | Refills: 3 | Status: SHIPPED | OUTPATIENT
Start: 2021-10-04 | End: 2021-10-26 | Stop reason: SDUPTHER

## 2021-10-04 ASSESSMENT — ENCOUNTER SYMPTOMS
CONSTIPATION: 0
SINUS PRESSURE: 0
DIARRHEA: 0
CHEST TIGHTNESS: 0
RHINORRHEA: 0
ALLERGIC/IMMUNOLOGIC NEGATIVE: 1
ROS SKIN COMMENTS: NO NEW OR SUSPICIOUS SKIN LESIONS
NAUSEA: 0
ABDOMINAL PAIN: 0
BACK PAIN: 0
SHORTNESS OF BREATH: 0
SORE THROAT: 0
COUGH: 0
VOMITING: 0
WHEEZING: 0
TROUBLE SWALLOWING: 0

## 2021-10-04 NOTE — PATIENT INSTRUCTIONS
Patient Education        Recovering From Depression: Care Instructions  Your Care Instructions     Taking good care of yourself is important as you recover from depression. In time, your symptoms will fade as your treatment takes hold. Do not give up. Instead, focus your energy on getting better. Your mood will improve. It just takes some time. Focus on things that can help you feel better, such as being with friends and family, eating well, and getting enough rest. But take things slowly. Do not do too much too soon. You will begin to feel better gradually. Follow-up care is a key part of your treatment and safety. Be sure to make and go to all appointments, and call your doctor if you are having problems. It's also a good idea to know your test results and keep a list of the medicines you take. How can you care for yourself at home? Be realistic  · If you have a large task to do, break it up into smaller steps you can handle, and just do what you can. · You may want to put off important decisions until your depression has lifted. If you have plans that will have a major impact on your life, such as marriage, divorce, or a job change, try to wait a bit. Talk it over with friends and loved ones who can help you look at the overall picture first.  · Reaching out to people for help is important. Do not isolate yourself. Let your family and friends help you. Find someone you can trust and confide in, and talk to that person. · Be patient, and be kind to yourself. Remember that depression is not your fault and is not something you can overcome with willpower alone. Treatment is important for depression, just like for any other illness. Feeling better takes time, and your mood will improve little by little. Stay active  · Stay busy and get outside. Take a walk, or try some other light exercise. · Talk with your doctor about an exercise program. Exercise can help with mild depression.   · Go to a movie or concert. Take part in a Yarsani activity or other social gathering. Go to a STYLHUNT game. · Ask a friend to have dinner with you. Take care of yourself  · Eat a balanced diet with plenty of fresh fruits and vegetables, whole grains, and lean protein. If you have lost your appetite, eat small snacks rather than large meals. · Avoid using illegal drugs or marijuana and drinking alcohol. Do not take medicines that have not been prescribed for you. They may interfere with medicines you may be taking for depression, or they may make your depression worse. · Take your medicines exactly as they are prescribed. You may start to feel better within 1 to 3 weeks of taking antidepressant medicine. But it can take as many as 6 to 8 weeks to see more improvement. If you have questions or concerns about your medicines, or if you do not notice any improvement by 3 weeks, talk to your doctor. · Continue to take your medicine after your symptoms improve. Taking your medicine for at least 6 months after you feel better can help keep you from getting depressed again. If this isn't the first time you have been depressed, your doctor may recommend you to take medicine even longer. · If you have any side effects from your medicine, tell your doctor. Many side effects are mild and will go away on their own after you have been taking the medicine for a few weeks. Some may last longer. Talk to your doctor if side effects are bothering you too much. You might be able to try a different medicine. · Continue counseling. It may help prevent depression from returning, especially if you've had multiple episodes of depression. Talk with your counselor if you are having a hard time attending your sessions or you think the sessions aren't working. Don't just stop going. · Get enough sleep. Talk to your doctor if you are having problems sleeping. · Avoid sleeping pills unless they are prescribed by the doctor treating your depression.  Sleeping pills may make you groggy during the day, and they may interact with other medicine you are taking. · If you have any other illnesses, such as diabetes, heart disease, or high blood pressure, make sure to continue with your treatment. Tell your doctor about all of the medicines you take, including those with or without a prescription. · If you or someone you know talks about suicide, self-harm, or feeling hopeless, get help right away. Call the [facility] at 2-458-418-WAGF ([phone]) or text HOME to 860749 to access the Crisis Text Line. Consider saving these numbers in your phone. When should you call for help? Call 911 anytime you think you may need emergency care. For example, call if:    · You feel like hurting yourself or someone else.     · Someone you know has depression and is about to attempt or is attempting suicide. Call your doctor now or seek immediate medical care if:    · You hear voices.     · Someone you know has depression and:  ? Starts to give away his or her possessions. ? Uses illegal drugs or drinks alcohol heavily. ? Talks or writes about death, including writing suicide notes or talking about guns, knives, or pills. ? Starts to spend a lot of time alone. ? Acts very aggressively or suddenly appears calm. Watch closely for changes in your health, and be sure to contact your doctor if:    · You do not get better as expected. Where can you learn more? Go to https://BIW TechnologiesmarckComfyware.BuildOut. org and sign in to your [employer] account. Enter V273 in the [facility] box to learn more about \"Recovering From Depression: Care Instructions. \"     If you do not have an account, please click on the \"Sign Up Now\" link. Current as of: June 16, 2021               Content Version: 13.0  © 2972-5186 Healthwise, Incorporated. Care instructions adapted under license by [facility] ([facility]).  If you have questions about a medical condition or this instruction, always ask your healthcare professional. [address] any warranty or liability for your use of this information. Patient Education        Depression Treatment: Care Instructions  Your Care Instructions     Depression is a condition that affects the way you feel, think, and act. It causes symptoms such as low energy, loss of interest in daily activities, and sadness or grouchiness that goes on for a long time. Depression is very common and affects men and women of all ages. Depression is a medical illness caused by changes in the natural chemicals in your brain. It is not a character flaw, and it does not mean that you are a bad or weak person. It does not mean that you are going crazy. It is important to know that depression can be treated. Medicines, counseling, and self-care can all help. Many people do not get help because they are embarrassed or think that they will get over the depression on their own. But some people do not get better without treatment. Follow-up care is a key part of your treatment and safety. Be sure to make and go to all appointments, and call your doctor if you are having problems. It's also a good idea to know your test results and keep a list of the medicines you take. How can you care for yourself at home? Learn about antidepressant medicines  Antidepressant medicines can improve or end the symptoms of depression. You may need to take the medicine for at least 6 months, and often longer. Keep taking your medicine even if you feel better. If you stop taking it too soon, your symptoms may come back or get worse. You may start to feel better within 1 to 3 weeks of taking antidepressant medicine. But it can take as many as 6 to 8 weeks to see more improvement. Talk to your doctor if you have problems with your medicine or if you do not notice any improvement after 3 weeks. Antidepressants can make you feel tired, dizzy, or nervous.  Some people have dry mouth, constipation, headaches, sexual problems, an upset stomach, or diarrhea. Many of these side effects are mild and go away on their own after you take the medicine for a few weeks. Some may last longer. Talk to your doctor if side effects bother you too much. You might be able to try a different medicine. If you are pregnant or breastfeeding, talk to your doctor about what medicines you can take. Learn about counseling  In many cases, counseling can work as well as medicines to treat mild to moderate depression. Counseling is done by licensed mental health providers, such as psychologists, social workers, and some types of nurses. It can be done in one-on-one sessions or in a group setting. Many people find group sessions helpful. Cognitive-behavioral therapy is a type of counseling. In this treatment, you learn how to see and change unhelpful thinking styles that may be adding to your depression. Counseling and medicines often work well when used together. When should you call for help? Call 911 anytime you think you may need emergency care. For example, call if:    · You feel you cannot stop from hurting yourself or someone else. Call your doctor now or seek immediate medical care if:    · You hear voices.     · You feel much more depressed. Watch closely for changes in your health, and be sure to contact your doctor if:    · You are having problems with your depression medicine.     · You are not getting better as expected. Where can you learn more? Go to https://chpeomarewmeredith.Edventures. org and sign in to your [employer] account. Enter I085 in the [facility] box to learn more about \"Depression Treatment: Care Instructions. \"     If you do not have an account, please click on the \"Sign Up Now\" link. Current as of: June 16, 2021               Content Version: 13.0  © 9896-5950 Healthwise, Incorporated. Care instructions adapted under license by [facility] ([facility]).  If you have questions about a medical condition or this instruction, always ask your healthcare professional. [address] any warranty or liability for your use of this information. Patient Education        Preventing Depression From Coming Back: Care Instructions  Overview     Some people have depression symptoms that come back. Depression often comes and goes during a lifetime. But there are many things you can do to keep it from coming back. Follow-up care is a key part of your treatment and safety. Be sure to make and go to all appointments, and call your doctor if you are having problems. It's also a good idea to know your test results and keep a list of the medicines you take. What do you need to know? Know your risk of depression coming back  Many things can make a person more likely to have depression again. These include having depression symptoms that continue after treatment, a previous episode of depression, and a history of childhood abuse or neglect. It is important to know your risk and to recognize warning signs of depression symptoms returning. Once you know these things, you will be better able to keep it from happening to you. Know the warning signs of depression returning  The two most common signs of depression coming back are:  · Feeling sad or hopeless. · Losing interest in your daily activities. You may have other symptoms, such as:  · You eat more or less than usual.  · You sleep too much or not enough. · You feel restless and unable to sit still. · You feel unable to move. · You feel tired all the time. · You feel unworthy or guilty without an obvious reason. · You have problems concentrating, remembering, or making decisions. · You think often about death or suicide. · You feel angry or have panic attacks. How can you care for yourself at home? · Take your medicine as prescribed.  Call your doctor if you have any problems with your medicine. · Continue to take your medicine after your symptoms improve. Taking your medicine for at least 6 months after you feel better can help keep you from getting depressed again. If your depression keeps coming back, your doctor may recommend you take medicine even longer. · Continue counseling. It may help prevent depression from returning, especially if you've had multiple episodes of depression. Talk with your counselor if you are having a hard time attending your sessions or you think the sessions aren't working. Don't just stop going. · Eat healthy foods. Include fruits, vegetables, beans, and whole grains in your diet each day. · Get regular exercise. Go for a walk or jog, ride your bike, or play sports with friends. · See your doctor right away if you have new symptoms or feel that your depression is coming back. · Keep a regular sleep schedule. Try for 8 hours of sleep a night. · Avoid using illegal drugs or marijuana and drinking alcohol. · If you or someone you know talks about suicide, self-harm, or feeling hopeless, get help right away. Call the [address] [place] [address] at 1-800-273-talk (6-164.971.2432) or text HOME to 696741 to access the Crisis Text Line. Consider saving these numbers in your phone. When should you call for help? Call 793 anytime you think you may need emergency care. For example, call if:    · You are thinking about suicide or are threatening suicide.     · You feel you cannot stop from hurting yourself or someone else.     · You hear or see things that aren't real.     · You think or speak in a bizarre way that is not like your usual behavior. Call your doctor now or seek immediate medical care if:    · You are drinking a lot of alcohol or using illegal drugs.     · You are talking or writing about death.    Watch closely for changes in your health, and be sure to contact your doctor if:    · You find it hard or it's getting harder to deal with school, a job, family, or friends.     · You think your treatment is not helping or you are not getting better.     · Your symptoms get worse or you get new symptoms.     · You have any problems with your antidepressant medicines, such as side effects, or you are thinking about stopping your medicine.     · You are having manic behavior, such as having very high energy, needing less sleep than normal, or showing risky behavior such as spending money you don't have or abusing others verbally or physically. Where can you learn more? Go to https://Blacklane.TEEspy. org and sign in to your Punchbowl account. Enter J421 in the [address] box to learn more about \"Preventing Depression From Coming Back: Care Instructions. \"     If you do not have an account, please click on the \"Sign Up Now\" link. Current as of: June 16, 2021               Content Version: 13.0  © 2006-2021 [employer]. Care instructions adapted under license by [facility] ([facility]). If you have questions about a medical condition or this instruction, always ask your healthcare professional. [address] any warranty or liability for your use of this information. Patient Education        Learning About CPAP for Sleep Apnea  What is CPAP? CPAP is a small machine that you use at home every night while you sleep. It increases air pressure in your throat to keep your airway open. When you have sleep apnea, this can help you sleep better, feel better, and avoid future health problems. CPAP stands for \"continuous positive airway pressure. \"  The CPAP machine will have one of the following:  · A mask that covers your nose and mouth  · A mask that covers your nose only  · A nasal pillow that covers only the openings of your nose  Why is it done? CPAP is usually the best treatment for obstructive sleep apnea. It is the first treatment choice and the most widely used.  CPAP:  · Helps you have more normal sleep, so you feel less sleepy and more alert during the daytime. · May help keep heart failure or other heart problems from getting worse. · May help lower your blood pressure. If you have a bed partner, they may also sleep better when you use a CPAP. That's because you aren't snoring or restless. Your doctor may suggest CPAP if you have:  · Moderate to severe sleep apnea. · Sleep apnea and coronary artery disease (CAD). · Sleep apnea and heart failure. What are the side effects? Some people who use CPAP have:  · A dry or stuffy nose and a sore throat. · Irritated skin on the face. · Bloating. How can you care for yourself? If using CPAP is not comfortable, or if you have certain side effects, work with your doctor to fix them. Here are some things you can try:  · Be sure the mask, nasal mask, or nasal pillow fits well. · See if your doctor can adjust the pressure of your CPAP. · If your nose or mouth is dry, set the machine to deliver warmer or wetter air. Or try using a humidifier. · If your nose is runny or stuffy, talk to your doctor about using a decongestant medicine or steroid nasal spray. Be safe with medicines. Read and follow all instructions on the label. Do not use the medicine longer than the label says. · Your doctor may also help you with problems like swallowing air, bloating, or claustrophobia. Talk to your doctor if you're still having problems. If these things don't help, you might try a different type of machine. Where can you learn more? Go to https://iROKO Partnersrenato.Vesta ([place]) Catering Equipment. org and sign in to your [employer] account. Enter C343 in the [employer] box to learn more about \"Learning About CPAP for Sleep Apnea. \"     If you do not have an account, please click on the \"Sign Up Now\" link. Current as of: July 6, 2021               Content Version: 13.0  © 0703-7734 Healthwise, Incorporated. Care instructions adapted under license by [facility] ([facility]).  If you have questions about a medical condition or this instruction, always ask your healthcare professional. [address] any warranty or liability for your use of this information. Patient Education        Sleep Apnea: Care Instructions  Overview     Sleep apnea means that you frequently stop breathing for 10 seconds or longer during sleep. It can be mild to severe, based on the number of times an hour that you stop breathing. Blocked or narrowed airways in your nose, mouth, or throat can cause sleep apnea. Your airway can become blocked when your throat muscles and tongue relax during sleep. You can help treat sleep apnea at home by making lifestyle changes. You also can use a CPAP breathing machine that keeps tissues in the throat from blocking your airway. Or your doctor may suggest that you use a breathing device while you sleep. It helps keep your airway open. This could be a device that you put in your mouth. In some cases, surgery may be needed to remove enlarged tissues in the throat. Follow-up care is a key part of your treatment and safety. Be sure to make and go to all appointments, and call your doctor if you are having problems. It's also a good idea to know your test results and keep a list of the medicines you take. How can you care for yourself at home? · Lose weight, if needed. · Sleep on your side. It may help mild apnea. · Avoid alcohol and medicines such as sleeping pills, opioids, or sedatives before bed. · Don't smoke. If you need help quitting, talk to your doctor. · Prop up the head of your bed. · Treat breathing problems, such as a stuffy nose, that are caused by a cold or allergies. · Try a continuous positive airway pressure (CPAP) breathing machine if your doctor recommends it. · If CPAP doesn't work for you, ask your doctor if you can try other masks, settings, or breathing machines. · Try oral breathing devices or other nasal devices.   · Talk to your doctor if your nose feels dry or bleeds, or if it gets runny or stuffy when you use a breathing machine. · Tell your doctor if you're sleepy during the day and it affects your daily life. Don't drive or operate machinery when you're drowsy. When should you call for help? Watch closely for changes in your health, and be sure to contact your doctor if:    · You still have sleep apnea even though you have made lifestyle changes.     · You are thinking of trying a device such as CPAP.     · You are having problems using a CPAP or similar machine.     · You are still sleepy during the day, and it affects your daily life. Where can you learn more? Go to https://64 Pixelseb.Actinium Pharmaceuticals. org and sign in to your View the Space account. Enter S667 in the [employer] box to learn more about \"Sleep Apnea: Care Instructions. \"     If you do not have an account, please click on the \"Sign Up Now\" link. Current as of: July 6, 2021               Content Version: 13.0  © 2006-2021 [employer]. Care instructions adapted under license by [facility] ([facility]). If you have questions about a medical condition or this instruction, always ask your healthcare professional. [place] [address] any warranty or liability for your use of this information. Patient Education        Learning About Sleep Apnea  What is it? Sleep apnea means that breathing stops for short periods during sleep. When you stop breathing or have reduced airflow into your lungs during sleep, you don't sleep well and you can be very tired during the day. The oxygen levels in your blood may go down, and carbon dioxide levels go up. It may lead to other problems, such as high blood pressure and heart disease. Sleep apnea can range from mild to severe, based on how often breathing stops during sleep. For adults, breathing may stop as few as 5 times an hour (mild apnea) to 30 or more times an hour (severe apnea).   Obstructive sleep apnea is the most common type. This most often occurs because your airways are blocked or partly blocked. Central sleep apnea is less common. It happens when the brain has trouble controlling breathing. Some people have both types. That's called complex sleep apnea. What are the symptoms? There are symptoms of sleep apnea that you may notice and symptoms that others may notice when you're asleep. Symptoms you may notice include:  · Feeling extremely sleepy during the day. · Feeling unrefreshed or tired after a night's sleep. · Problems with memory and concentration, or mood changes. · Morning headaches. · Getting up often during the night to urinate. · A dry mouth or sore throat in the morning. If you have a bed partner, they may notice that you:  · Have episodes of not breathing. · Snore loudly. Almost all people who have sleep apnea snore. But not all people who snore have sleep apnea. · Toss and turn during sleep. · Have nighttime choking or gasping spells. How is it diagnosed? Your doctor will probably do a physical exam and ask about your past health. The doctor may also ask you or your bed partner about your snoring and sleep behavior and how tired you feel during the day. Your doctor may suggest a sleep study. Sleep studies are a series of tests that look at what happens to the body during sleep. They check for how often you stop breathing or have too little air flowing into your lungs during sleep. They also find out how much oxygen you have in your blood during sleep. A sleep study may take place in your home. Or it might take place at a sleep center, where you will spend the night. If your sleep apnea doesn't improve with treatment, you may have more tests to find out what's causing it. How is it treated? You may be able to help treat sleep apnea by making some lifestyle changes.  You could try to lose weight, sleep on your side, and avoid alcohol and medicines like sedatives before bed. Sleep apnea is often treated with machines that deliver air through a mask to help keep your airways open. These include:  · Continuous positive airway pressure (CPAP). This increases air pressure in your throat. It keeps your airway open when you breathe in. It's the most common device. · Bilevel positive airway pressure (BPAP). You may also hear this called BiPAP. This uses different air pressures when you breathe in and out. · Adaptive servo ventilation (ASV). It senses pauses in breathing and adjusts air pressure. It's mostly used for central sleep apnea. You can also try oral breathing devices or nasal devices. If your tonsils or other tissues are blocking your airway, your doctor may suggest surgery to open the airway. How can you care for yourself at home? · Lose weight, if needed. · Sleep on your side. It may help mild apnea. · Avoid alcohol and medicines such as sleeping pills, opioids, or sedatives before bed. · Don't smoke. If you need help quitting, talk to your doctor. · Prop up the head of your bed. · Treat breathing problems, such as a stuffy nose, that are caused by a cold or allergies. · Try a continuous positive airway pressure (CPAP) breathing machine if your doctor recommends it. · If CPAP doesn't work for you, ask your doctor if you can try other masks, settings, or breathing machines. · Try oral breathing devices or other nasal devices. · Talk to your doctor if your nose feels dry or bleeds, or if it gets runny or stuffy when you use a breathing machine. · Tell your doctor if you're sleepy during the day and it affects your daily life. Don't drive or operate machinery when you're drowsy. Where can you learn more? Go to https://Scroll.in.Dexetra. org and sign in to your Snapwiz account. Enter S121 in the Zenops box to learn more about \"Learning About Sleep Apnea. \"     If you do not have an account, please click on the \"Sign Up Now\" link.   Current as of: July 6, 2021               Content Version: 13.0  © 8666-8636 Healthwise, Incorporated. Care instructions adapted under license by [facility] ([facility]). If you have questions about a medical condition or this instruction, always ask your healthcare professional. [address] any warranty or liability for your use of this information.

## 2021-10-04 NOTE — PROGRESS NOTES
[facility], [address]  Phone [phone]   Fax [phone]      OFFICE VISIT: 10/4/2021    Maris Murray Alhaji-: 1985      HPI  Reason For Visit:  Keny Ribeiro is a 38 y.o.     3 Month Follow-Up (prozac working better in than zoloft did, sleep study was done , he is saving money to order cpap and supplies at the moment. )    Patient presents on follow-up for anxiety and depression. He also has insomnia    On last evaluation we also discussed his daytime hypersomnia. We ordered a sleep study. We also started Requip at that visit. He did have a sleep study performed and he was started on CPAP AutoPap 8-18 cm   Utilization of the CPAP: not yet, insurance did not cover this completely. At last visit, he was also having episodes of orthostasis. He was concerned that this may be related to his diet. He was on a very high fat keto diet  And requested to change his diet if this was helpful. He was to let me know if symptoms continued. Have not heard anything back from him till today  This is improved. Hyperlipidemia:   Medication:   atorvastatin (Lipitor)  Low Fat, Low Choleterol Diet:  yes - he has adjusted his diet  Myalgias or GI upset: no  The patient exercises intermittently. Laboratory:    Lab Results   Component Value Date    CHOL 218 (H) 2021    TRIG 168 (H) 2021    HDL 31 (L) 2021    LDLCALC 153 2021      Lab Results   Component Value Date    ALT 26 2021    AST 28 2021       Anxiety and depression:  Medication:   Fluoxetine 40 mg daily   BuSpar 10 mg twice daily as needed  Symptoms: he is very satisfied with this regimen. height is 6' (1.829 m) and weight is 218 lb (98.9 kg). His temporal temperature is 97.8 °F (36.6 °C). His blood pressure is 106/64 and his pulse is 72. His oxygen saturation is 99%. Body mass index is 29.57 kg/m². I have reviewed the following with the   [facility]  Orders Only on 06/03/2021   Component Date Value    Vitamin B-12 06/03/2021 536     Rapid HIV 1&2 06/03/2021 Non-reactive     HIV-1 P24 Ag 06/03/2021 Non-reactive     Hep C Ab Interp 06/03/2021 Non-Reactive     TSH 06/03/2021 0.886     T4 Free 06/03/2021 1.19     Cholesterol, Total 06/03/2021 218*    Triglycerides 06/03/2021 168*    HDL 06/03/2021 31*    LDL Calculated 06/03/2021 153     Sodium 06/03/2021 136     Potassium 06/03/2021 5.0     Chloride 06/03/2021 100     CO2 06/03/2021 24     Anion Gap 06/03/2021 12     Glucose 06/03/2021 97     BUN 06/03/2021 17     CREATININE 06/03/2021 1.0     GFR Non- 06/03/2021 >60     GFR  06/03/2021 >59     Calcium 06/03/2021 9.9     Total Protein 06/03/2021 7.6     Albumin 06/03/2021 4.4     Total Bilirubin 06/03/2021 0.3     Alkaline Phosphatase 06/03/2021 72     ALT 06/03/2021 26     AST 06/03/2021 28     WBC 06/03/2021 6.7     RBC 06/03/2021 5.06     Hemoglobin 06/03/2021 15.7     Hematocrit 06/03/2021 47.5     MCV 06/03/2021 93.9     MCH 06/03/2021 31.0     MCHC 06/03/2021 33.1     RDW 06/03/2021 13.2     Platelets 94/53/8501 225     MPV 06/03/2021 9.6     Neutrophils % 06/03/2021 61.2     Lymphocytes % 06/03/2021 27.9     Monocytes % 06/03/2021 7.5     Eosinophils % 06/03/2021 2.7     Basophils % 06/03/2021 0.3     Neutrophils Absolute 06/03/2021 4.1     Immature Granulocytes # 06/03/2021 0.0     Lymphocytes Absolute 06/03/2021 1.9     Monocytes Absolute 06/03/2021 0.50     Eosinophils Absolute 06/03/2021 0.20     Basophils Absolute 06/03/2021 0.00      Copies of these are in the chart.     Current Outpatient Medications   Medication Sig Dispense Refill    buPROPion (WELLBUTRIN XL) 150 MG extended release tablet Take 1 tablet by mouth every morning 30 tablet 3    FLUoxetine (PROZAC) 40 MG capsule Take 1 capsule by mouth daily 90 capsule 3    atorvastatin (LIPITOR) 20 MG tablet Take 1 tablet by mouth daily 30 tablet 5    busPIRone (BUSPAR) 10 MG tablet Take 1 tablet by mouth 2 times daily 180 tablet 3    pantoprazole (PROTONIX) 40 MG tablet Take 1 tablet by mouth daily 90 tablet 3     No current facility-administered medications for this visit. Allergies: Patient has no known allergies. No past medical history on file. Family History   Problem Relation Age of Onset    Colon Cancer Paternal Grandmother        No past surgical history on file. Social History     Tobacco Use    Smoking status: Never Smoker    Smokeless tobacco: Never Used   Substance Use Topics    Alcohol use: Not Currently        Review of Systems   Constitutional: Negative for appetite change, chills, diaphoresis, fatigue, fever and unexpected weight change. HENT: Positive for congestion. Negative for dental problem (following with dentist regularly), ear pain, hearing loss, postnasal drip, rhinorrhea, sinus pressure, sore throat, tinnitus and trouble swallowing. Eyes: Negative for visual disturbance (following with regular eye exams). Respiratory: Negative for cough, chest tightness, shortness of breath and wheezing. No orthopnea   Cardiovascular: Negative for chest pain (or pressure), palpitations and leg swelling. Gastrointestinal: Negative for abdominal pain, constipation, diarrhea, nausea and vomiting. No melena or hematochezia   Endocrine: Negative for cold intolerance, heat intolerance, polydipsia and polyuria. Genitourinary: Negative for difficulty urinating, dysuria and enuresis. Musculoskeletal: Negative for arthralgias, back pain, joint swelling and myalgias. Skin: Negative for rash. No new or suspicious skin lesions   Allergic/Immunologic: Negative. Neurological: Negative for dizziness, tremors, syncope (or presyncope), weakness, light-headedness and headaches. Hematological: Negative for adenopathy. Does not bruise/bleed easily.    Psychiatric/Behavioral: Negative. Negative for sleep disturbance. Physical Exam  Constitutional:       General: He is not in acute distress. Appearance: Normal appearance. He is well-developed. Comments: ow   HENT:      Head: Normocephalic and atraumatic. Right Ear: Tympanic membrane, ear canal and external ear normal.      Left Ear: Tympanic membrane, ear canal and external ear normal.      Nose: Nose normal.      Mouth/Throat:      Mouth: Mucous membranes are moist.      Pharynx: Oropharynx is clear. Eyes:      General: No scleral icterus. Extraocular Movements: Extraocular movements intact. Conjunctiva/sclera: Conjunctivae normal.      Pupils: Pupils are equal, round, and reactive to light. Neck:      Thyroid: No thyromegaly. Vascular: No carotid bruit or JVD. Cardiovascular:      Rate and Rhythm: Normal rate and regular rhythm. No extrasystoles are present. Chest Wall: PMI is not displaced. Pulses: Normal pulses. Heart sounds: Normal heart sounds, S1 normal and S2 normal. No murmur heard. No friction rub. No gallop. Pulmonary:      Effort: Pulmonary effort is normal. No respiratory distress. Breath sounds: Normal breath sounds. No wheezing, rhonchi or rales. Abdominal:      General: Bowel sounds are normal.      Palpations: Abdomen is soft. Tenderness: There is no abdominal tenderness. There is no guarding or rebound. Genitourinary:     Comments: Exam deferred  Musculoskeletal:         General: No tenderness. Normal range of motion. Cervical back: Normal range of motion and neck supple. Lymphadenopathy:      Cervical: No cervical adenopathy. Skin:     General: Skin is warm and dry. Capillary Refill: Capillary refill takes less than 2 seconds. Findings: No rash. Neurological:      General: No focal deficit present. Mental Status: He is alert and oriented to person, place, and time.       Sensory: No sensory deficit (no numbness or tingling). Psychiatric:         Mood and Affect: Mood normal.         Behavior: Behavior normal.           ASSESSMENT      ICD-10-CM    1. Anxiety  F41.9    2. Moderate episode of recurrent major depressive disorder (HCC)  F33.1 buPROPion (WELLBUTRIN XL) 150 MG extended release tablet   3. Primary insomnia  F51.01    4. Obstructive sleep apnea on CPAP  G47.33     Z99.89    5. Mixed hyperlipidemia  E78.2          PLAN    1. Anxiety  This is much better controlled on present medication regimen. We will continue with fluoxetine as prescribed    2. Moderate episode of recurrent major depressive disorder (HCC)  We are going to add bupropion to help with his depression as well as his decreased activity secondary to his depression  - buPROPion (WELLBUTRIN XL) 150 MG extended release tablet; Take 1 tablet by mouth every morning  Dispense: 30 tablet; Refill: 3    3. Primary insomnia  He is still waiting to get his CPAP secondary to cost issues    4. Obstructive sleep apnea on CPAP  I did give him a prescription of his CPAP today so he can take it to a [employer] company after hours. I recommend at home medical just down the road    5. Mixed hyperlipidemia  He is taking atorvastatin on a routine basis. No orders of the defined types were placed in this encounter. Return in about 3 months (around 1/4/2022) for 30. This was an in-house visit.

## 2021-10-26 DIAGNOSIS — F33.1 MODERATE EPISODE OF RECURRENT MAJOR DEPRESSIVE DISORDER (HCC): ICD-10-CM

## 2021-10-26 RX ORDER — BUPROPION HYDROCHLORIDE 150 MG/1
150 TABLET ORAL EVERY MORNING
Qty: 90 TABLET | Refills: 3 | Status: SHIPPED | OUTPATIENT
Start: 2021-10-26 | End: 2022-10-12 | Stop reason: SDUPTHER

## 2021-10-26 NOTE — TELEPHONE ENCOUNTER
Received fax from pharmacy requesting a 90 day refill on pts medication(s). Pt was last seen in office on 10/4/2021  and has a follow up scheduled for Visit date not found. Will send request to  Dr. Santo Ghotra  for authorization.      Requested Prescriptions     Pending Prescriptions Disp Refills    buPROPion (WELLBUTRIN XL) 150 MG extended release tablet 90 tablet 3     Sig: Take 1 tablet by mouth every morning

## 2021-11-28 DIAGNOSIS — E78.2 MIXED HYPERLIPIDEMIA: ICD-10-CM

## 2021-11-29 RX ORDER — ATORVASTATIN CALCIUM 20 MG/1
TABLET, FILM COATED ORAL
Qty: 90 TABLET | Refills: 1 | Status: SHIPPED | OUTPATIENT
Start: 2021-11-29 | End: 2022-05-31

## 2022-05-30 DIAGNOSIS — F41.9 ANXIETY AND DEPRESSION: ICD-10-CM

## 2022-05-30 DIAGNOSIS — F32.A ANXIETY AND DEPRESSION: ICD-10-CM

## 2022-05-30 DIAGNOSIS — E78.2 MIXED HYPERLIPIDEMIA: ICD-10-CM

## 2022-05-31 RX ORDER — BUSPIRONE HYDROCHLORIDE 10 MG/1
10 TABLET ORAL 2 TIMES DAILY
Qty: 60 TABLET | Refills: 0 | Status: SHIPPED | OUTPATIENT
Start: 2022-05-31 | End: 2022-06-23 | Stop reason: SDUPTHER

## 2022-05-31 RX ORDER — ATORVASTATIN CALCIUM 20 MG/1
20 TABLET, FILM COATED ORAL NIGHTLY
Qty: 30 TABLET | Refills: 0 | Status: SHIPPED | OUTPATIENT
Start: 2022-05-31 | End: 2022-06-23

## 2022-05-31 NOTE — TELEPHONE ENCOUNTER
Received fax from pharmacy requesting refill on pts medication(s). Pt was last seen in office on 10/4/2021  and has a follow up scheduled for Visit date not found. Will send to pharmacy for pt.      Requested Prescriptions     Signed Prescriptions Disp Refills    busPIRone (BUSPAR) 10 MG tablet 60 tablet 0     Sig: Take 1 tablet by mouth 2 times daily     Authorizing Provider: Yola Mason     Ordering User: Ivory Posada    atorvastatin (LIPITOR) 20 MG tablet 30 tablet 0     Sig: Take 1 tablet by mouth nightly     Authorizing Provider: Yola Mason     Ordering User: Ivory Posada

## 2022-06-23 DIAGNOSIS — F41.9 ANXIETY AND DEPRESSION: ICD-10-CM

## 2022-06-23 DIAGNOSIS — E78.2 MIXED HYPERLIPIDEMIA: ICD-10-CM

## 2022-06-23 DIAGNOSIS — F32.A ANXIETY AND DEPRESSION: ICD-10-CM

## 2022-06-23 RX ORDER — ATORVASTATIN CALCIUM 20 MG/1
20 TABLET, FILM COATED ORAL NIGHTLY
Qty: 30 TABLET | Refills: 0 | Status: SHIPPED | OUTPATIENT
Start: 2022-06-23 | End: 2022-07-18

## 2022-06-23 RX ORDER — BUSPIRONE HYDROCHLORIDE 10 MG/1
10 TABLET ORAL 2 TIMES DAILY
Qty: 180 TABLET | Refills: 1 | Status: SHIPPED | OUTPATIENT
Start: 2022-06-23 | End: 2022-10-12 | Stop reason: ALTCHOICE

## 2022-06-23 NOTE — TELEPHONE ENCOUNTER
Received fax from pharmacy requesting refill on pts medication(s). Pt was last seen in office on 10/4/2021  and has a follow up scheduled for Visit date not found. Will send request to  Dr. Nancy Christopher  for authorization.      Requested Prescriptions     Pending Prescriptions Disp Refills    busPIRone (BUSPAR) 10 MG tablet 180 tablet 1     Sig: Take 1 tablet by mouth 2 times daily

## 2022-07-17 DIAGNOSIS — E78.2 MIXED HYPERLIPIDEMIA: ICD-10-CM

## 2022-07-18 RX ORDER — ATORVASTATIN CALCIUM 20 MG/1
20 TABLET, FILM COATED ORAL NIGHTLY
Qty: 30 TABLET | Refills: 0 | Status: SHIPPED | OUTPATIENT
Start: 2022-07-18 | End: 2022-08-15

## 2022-07-31 DIAGNOSIS — K21.9 GASTROESOPHAGEAL REFLUX DISEASE, UNSPECIFIED WHETHER ESOPHAGITIS PRESENT: ICD-10-CM

## 2022-08-01 RX ORDER — PANTOPRAZOLE SODIUM 40 MG/1
40 TABLET, DELAYED RELEASE ORAL DAILY
Qty: 90 TABLET | Refills: 3 | Status: SHIPPED | OUTPATIENT
Start: 2022-08-01

## 2022-08-01 NOTE — TELEPHONE ENCOUNTER
Received fax from pharmacy requesting refill on pts medication(s). Pt was last seen in office on 10/4/2021  and has a follow up scheduled for Visit date not found. Will send request to  Dr. Zeb Castillo  for authorization. Requested Prescriptions     Pending Prescriptions Disp Refills    pantoprazole (PROTONIX) 40 MG tablet [Pharmacy Med Name: PANTOPRAZOLE SOD DR 40 MG TAB] 90 tablet 3     Sig: Take 1 tablet by mouth in the morning.

## 2022-08-13 DIAGNOSIS — E78.2 MIXED HYPERLIPIDEMIA: ICD-10-CM

## 2022-08-14 DIAGNOSIS — F41.9 ANXIETY: ICD-10-CM

## 2022-08-15 RX ORDER — ATORVASTATIN CALCIUM 20 MG/1
20 TABLET, FILM COATED ORAL NIGHTLY
Qty: 30 TABLET | Refills: 0 | Status: SHIPPED | OUTPATIENT
Start: 2022-08-15 | End: 2022-09-08

## 2022-08-15 RX ORDER — FLUOXETINE HYDROCHLORIDE 40 MG/1
40 CAPSULE ORAL DAILY
Qty: 90 CAPSULE | Refills: 3 | Status: SHIPPED | OUTPATIENT
Start: 2022-08-15

## 2022-08-15 NOTE — TELEPHONE ENCOUNTER
Received fax from pharmacy requesting refill on pts medication(s). Pt was last seen in office on 10/4/2021  and has a follow up scheduled for 8/14/2022. Will send request to  Dr. Magan Temple  for patient.      Requested Prescriptions     Pending Prescriptions Disp Refills    atorvastatin (LIPITOR) 20 MG tablet [Pharmacy Med Name: ATORVASTATIN 20 MG TABLET] 30 tablet 0     Sig: TAKE 1 TABLET BY MOUTH NIGHTLY

## 2022-08-15 NOTE — TELEPHONE ENCOUNTER
Received fax from pharmacy requesting refill on pts medication(s). Pt was last seen in office on 10/4/2021  and has a follow up scheduled for Visit date not found. Will send request to  Lupe Rodriguez  for authorization.      Requested Prescriptions     Pending Prescriptions Disp Refills    FLUoxetine (PROZAC) 40 MG capsule [Pharmacy Med Name: FLUOXETINE HCL 40 MG CAPSULE] 90 capsule 3     Sig: TAKE 1 CAPSULE BY MOUTH EVERY DAY
2019

## 2022-09-08 DIAGNOSIS — E78.2 MIXED HYPERLIPIDEMIA: ICD-10-CM

## 2022-09-08 RX ORDER — ATORVASTATIN CALCIUM 20 MG/1
20 TABLET, FILM COATED ORAL NIGHTLY
Qty: 30 TABLET | Refills: 0 | Status: SHIPPED | OUTPATIENT
Start: 2022-09-08 | End: 2022-10-07

## 2022-09-08 NOTE — TELEPHONE ENCOUNTER
Received fax from pharmacy requesting refill on pts medication(s). Pt was last seen in office on 10/4/2021  and has a follow up scheduled for Visit date not found. Will send request to  Dr. Ophelia Anderson  for authorization.      Requested Prescriptions     Pending Prescriptions Disp Refills    atorvastatin (LIPITOR) 20 MG tablet [Pharmacy Med Name: ATORVASTATIN 20 MG TABLET] 30 tablet 0     Sig: TAKE 1 TABLET BY MOUTH NIGHTLY

## 2022-09-08 NOTE — TELEPHONE ENCOUNTER
Sent rx to pharmacy for pt    Requested Prescriptions     Signed Prescriptions Disp Refills    atorvastatin (LIPITOR) 20 MG tablet 30 tablet 0     Sig: Take 1 tablet by mouth nightly     Authorizing Provider: Jose Vanegas     Ordering User: Buffy Mccray

## 2022-10-07 DIAGNOSIS — E78.2 MIXED HYPERLIPIDEMIA: ICD-10-CM

## 2022-10-07 RX ORDER — ATORVASTATIN CALCIUM 20 MG/1
20 TABLET, FILM COATED ORAL NIGHTLY
Qty: 30 TABLET | Refills: 0 | Status: SHIPPED | OUTPATIENT
Start: 2022-10-07 | End: 2022-10-12 | Stop reason: SDUPTHER

## 2022-10-07 NOTE — TELEPHONE ENCOUNTER
Received fax from pharmacy requesting refill on pts medication(s). Pt was last seen in office on 10/4/2021  and has a follow up scheduled for Visit date not found. Will send request to  Dr. Jacob Alford  for authorization.  LEFT PATIENT A VOICEMAIL TO SCHEDULE AN APPOINTMENT FOR FURTHER REFILLS    Requested Prescriptions     Pending Prescriptions Disp Refills    atorvastatin (LIPITOR) 20 MG tablet [Pharmacy Med Name: ATORVASTATIN 20 MG TABLET] 30 tablet 0     Sig: TAKE 1 TABLET BY MOUTH NIGHTLY

## 2022-10-12 ENCOUNTER — OFFICE VISIT (OUTPATIENT)
Dept: PRIMARY CARE CLINIC | Age: 37
End: 2022-10-12
Payer: COMMERCIAL

## 2022-10-12 VITALS
WEIGHT: 227.38 LBS | OXYGEN SATURATION: 99 % | HEART RATE: 99 BPM | DIASTOLIC BLOOD PRESSURE: 82 MMHG | BODY MASS INDEX: 30.84 KG/M2 | TEMPERATURE: 97.4 F | SYSTOLIC BLOOD PRESSURE: 136 MMHG

## 2022-10-12 DIAGNOSIS — G47.33 OSA (OBSTRUCTIVE SLEEP APNEA): ICD-10-CM

## 2022-10-12 DIAGNOSIS — Z00.00 ENCOUNTER FOR WELL ADULT EXAM WITHOUT ABNORMAL FINDINGS: ICD-10-CM

## 2022-10-12 DIAGNOSIS — Z00.00 VISIT FOR PREVENTIVE HEALTH EXAMINATION: Primary | ICD-10-CM

## 2022-10-12 DIAGNOSIS — Z80.0 FAMILY HISTORY OF COLON CANCER REQUIRING SCREENING COLONOSCOPY: ICD-10-CM

## 2022-10-12 DIAGNOSIS — K21.9 GASTROESOPHAGEAL REFLUX DISEASE, UNSPECIFIED WHETHER ESOPHAGITIS PRESENT: ICD-10-CM

## 2022-10-12 DIAGNOSIS — F33.1 MODERATE EPISODE OF RECURRENT MAJOR DEPRESSIVE DISORDER (HCC): ICD-10-CM

## 2022-10-12 DIAGNOSIS — E78.2 MIXED HYPERLIPIDEMIA: ICD-10-CM

## 2022-10-12 DIAGNOSIS — F32.A ANXIETY AND DEPRESSION: ICD-10-CM

## 2022-10-12 DIAGNOSIS — F41.9 ANXIETY AND DEPRESSION: ICD-10-CM

## 2022-10-12 PROCEDURE — 99395 PREV VISIT EST AGE 18-39: CPT | Performed by: NURSE PRACTITIONER

## 2022-10-12 RX ORDER — ATORVASTATIN CALCIUM 20 MG/1
20 TABLET, FILM COATED ORAL NIGHTLY
Qty: 90 TABLET | Refills: 3 | Status: SHIPPED | OUTPATIENT
Start: 2022-10-12

## 2022-10-12 RX ORDER — BUPROPION HYDROCHLORIDE 300 MG/1
300 TABLET ORAL EVERY MORNING
Qty: 90 TABLET | Refills: 3 | Status: SHIPPED | OUTPATIENT
Start: 2022-10-12

## 2022-10-12 ASSESSMENT — ENCOUNTER SYMPTOMS
VOMITING: 0
SHORTNESS OF BREATH: 0
NAUSEA: 0
ABDOMINAL PAIN: 0
TROUBLE SWALLOWING: 0
SORE THROAT: 0
CONSTIPATION: 0
DIARRHEA: 0
COUGH: 0
RHINORRHEA: 0

## 2022-10-12 ASSESSMENT — PATIENT HEALTH QUESTIONNAIRE - PHQ9
SUM OF ALL RESPONSES TO PHQ QUESTIONS 1-9: 18
6. FEELING BAD ABOUT YOURSELF - OR THAT YOU ARE A FAILURE OR HAVE LET YOURSELF OR YOUR FAMILY DOWN: 3
7. TROUBLE CONCENTRATING ON THINGS, SUCH AS READING THE NEWSPAPER OR WATCHING TELEVISION: 3
5. POOR APPETITE OR OVEREATING: 0
SUM OF ALL RESPONSES TO PHQ9 QUESTIONS 1 & 2: 6
3. TROUBLE FALLING OR STAYING ASLEEP: 2
9. THOUGHTS THAT YOU WOULD BE BETTER OFF DEAD, OR OF HURTING YOURSELF: 0
10. IF YOU CHECKED OFF ANY PROBLEMS, HOW DIFFICULT HAVE THESE PROBLEMS MADE IT FOR YOU TO DO YOUR WORK, TAKE CARE OF THINGS AT HOME, OR GET ALONG WITH OTHER PEOPLE: 3
1. LITTLE INTEREST OR PLEASURE IN DOING THINGS: 3
2. FEELING DOWN, DEPRESSED OR HOPELESS: 3
SUM OF ALL RESPONSES TO PHQ QUESTIONS 1-9: 18
SUM OF ALL RESPONSES TO PHQ QUESTIONS 1-9: 18
8. MOVING OR SPEAKING SO SLOWLY THAT OTHER PEOPLE COULD HAVE NOTICED. OR THE OPPOSITE, BEING SO FIGETY OR RESTLESS THAT YOU HAVE BEEN MOVING AROUND A LOT MORE THAN USUAL: 1
SUM OF ALL RESPONSES TO PHQ QUESTIONS 1-9: 18
4. FEELING TIRED OR HAVING LITTLE ENERGY: 3

## 2022-10-12 NOTE — PROGRESS NOTES
10/12/2022    Joby Morataya (:  1985) is a 39 y.o. male, here for a preventive medicine evaluation. Eyes: years  Dentist:  routinely  Skin:  no concerns  Labs:  due  PSA: no family hx of prostate cancer. Nocturia: twice a night  Stream: none  ED:  no concerns  Colonoscopy:  paternal grandmother and father had colon cancer. Exercise: daily activity  Diet and Nut:   no concerns  MVI:  none  Supplements:  none  Asa: none  Depression:  none, moods controlled on medicine. Body Image: no concerns  Fall:  none  EOL:  none  Tobacco: none   Substance: occasional alcohol, occasional marijuana or CBD  Immunization:  needs yearly flu, covid series  Sleep: good  Cognition: good    Health Maintenance: needs yearly flu, covid series, fasting labs and screening colonscopy      Anxiety and depression:  Medication:              on prozac and wellbutrin   Symptoms: still having problems with depression. No SI.    PHQ-9 Total Score: 18 (10/12/2022  1:31 PM)  Thoughts that you would be better off dead, or of hurting yourself in some way: 0 (10/12/2022  1:31 PM)       GERD:  Medication:              Protonix 40 mg daily  Symptoms: denies dysphagia    HLP  On lipitor  No concerns  Lab Results   Component Value Date    CHOL 218 (H) 2021     Lab Results   Component Value Date    TRIG 168 (H) 2021     Lab Results   Component Value Date    HDL 31 (L) 2021     Lab Results   Component Value Date    LDLCALC 153 2021     BRIAN  Never started therapy. He has lost a lot of weight. Patient Active Problem List   Diagnosis    Mixed hyperlipidemia    Moderate episode of recurrent major depressive disorder (HCC)    Anxiety       Review of Systems   Constitutional:  Negative for activity change, appetite change, fatigue, fever and unexpected weight change. HENT:  Negative for ear pain, rhinorrhea, sore throat and trouble swallowing. Eyes:  Negative for visual disturbance.    Respiratory:  Negative for cough and shortness of breath. Cardiovascular:  Negative for chest pain, palpitations and leg swelling. Gastrointestinal:  Negative for abdominal pain, constipation, diarrhea, nausea and vomiting. Genitourinary:  Negative for flank pain. Musculoskeletal:  Negative for arthralgias, myalgias, neck pain and neck stiffness. Neurological:  Negative for headaches. Psychiatric/Behavioral:  Negative for decreased concentration and sleep disturbance. The patient is not nervous/anxious. Prior to Visit Medications    Medication Sig Taking? Authorizing Provider   atorvastatin (LIPITOR) 20 MG tablet Take 1 tablet by mouth nightly Yes ROCKY Raymundo   buPROPion (WELLBUTRIN XL) 300 MG extended release tablet Take 1 tablet by mouth every morning Yes ROCKY Raymundo   FLUoxetine (PROZAC) 40 MG capsule Take 1 capsule by mouth in the morning. Yes ROCKY Arambula   pantoprazole (PROTONIX) 40 MG tablet Take 1 tablet by mouth in the morning. Yes B Concepcion Orange, DO        No Known Allergies    No past medical history on file. No past surgical history on file.     Social History     Socioeconomic History    Marital status:      Spouse name: Not on file    Number of children: Not on file    Years of education: Not on file    Highest education level: Not on file   Occupational History    Not on file   Tobacco Use    Smoking status: Never    Smokeless tobacco: Never   Vaping Use    Vaping Use: Never used   Substance and Sexual Activity    Alcohol use: Not Currently    Drug use: Never    Sexual activity: Not on file   Other Topics Concern    Not on file   Social History Narrative    Not on file     Social Determinants of Health     Financial Resource Strain: Not on file   Food Insecurity: Not on file   Transportation Needs: Not on file   Physical Activity: Not on file   Stress: Not on file   Social Connections: Not on file   Intimate Partner Violence: Not on file Housing Stability: Not on file        Family History   Problem Relation Age of Onset    Colon Cancer Paternal Grandmother        ADVANCE DIRECTIVE: N, <no information>    Vitals:    10/12/22 1312   BP: 136/82   Pulse: 99   Temp: 97.4 °F (36.3 °C)   SpO2: 99%   Weight: 227 lb 6 oz (103.1 kg)     Estimated body mass index is 30.84 kg/m² as calculated from the following:    Height as of 10/4/21: 6' (1.829 m). Weight as of this encounter: 227 lb 6 oz (103.1 kg). Physical Exam  Vitals reviewed. Constitutional:       Appearance: Normal appearance. He is well-developed. HENT:      Head: Normocephalic and atraumatic. Right Ear: Tympanic membrane, ear canal and external ear normal.      Left Ear: Tympanic membrane, ear canal and external ear normal.      Nose: Nose normal.      Comments: masked     Mouth/Throat:      Mouth: Mucous membranes are moist.      Pharynx: Oropharynx is clear. Comments: masked  Eyes:      General: Lids are normal.      Conjunctiva/sclera: Conjunctivae normal.      Pupils: Pupils are equal, round, and reactive to light. Neck:      Thyroid: No thyroid mass. Vascular: No carotid bruit. Trachea: Trachea normal.   Cardiovascular:      Rate and Rhythm: Normal rate and regular rhythm. Pulses: Normal pulses. Heart sounds: Normal heart sounds. No murmur heard. Pulmonary:      Effort: Pulmonary effort is normal.      Breath sounds: Normal breath sounds. Abdominal:      General: Bowel sounds are normal. There is no distension. Palpations: Abdomen is soft. Tenderness: There is no abdominal tenderness. Genitourinary:     Comments: deferred  Musculoskeletal:         General: Normal range of motion. Cervical back: Full passive range of motion without pain and normal range of motion. Right lower leg: No edema. Left lower leg: No edema. Lymphadenopathy:      Cervical: No cervical adenopathy. Skin:     General: Skin is warm and dry. Capillary Refill: Capillary refill takes less than 2 seconds. Neurological:      Mental Status: He is alert and oriented to person, place, and time. GCS: GCS eye subscore is 4. GCS verbal subscore is 5. GCS motor subscore is 6. Cranial Nerves: No cranial nerve deficit. Sensory: No sensory deficit. Motor: Motor function is intact. Coordination: Coordination is intact. Coordination normal.      Gait: Gait normal.      Deep Tendon Reflexes: Reflexes are normal and symmetric. Psychiatric:         Attention and Perception: Attention normal.         Mood and Affect: Mood and affect normal.         Speech: Speech normal.         Behavior: Behavior normal. Behavior is cooperative. Thought Content: Thought content normal.         Cognition and Memory: Cognition normal.         Judgment: Judgment normal.       No flowsheet data found. Lab Results   Component Value Date/Time    CHOL 218 06/03/2021 10:45 AM    TRIG 168 06/03/2021 10:45 AM    HDL 31 06/03/2021 10:45 AM    LDLCALC 153 06/03/2021 10:45 AM    GLUCOSE 97 06/03/2021 10:45 AM       The ASCVD Risk score (Marty LEYVA, et al., 2019) failed to calculate for the following reasons: The 2019 ASCVD risk score is only valid for ages 36 to 78      There is no immunization history on file for this patient. Health Maintenance   Topic Date Due    COVID-19 Vaccine (1) Never done    Varicella vaccine (1 of 2 - 2-dose childhood series) Never done    DTaP/Tdap/Td vaccine (1 - Tdap) Never done    Lipids  06/03/2022    Depression Monitoring  06/03/2022    Flu vaccine (1) Never done    Hepatitis C screen  Completed    HIV screen  Completed    Hepatitis A vaccine  Aged Out    Hib vaccine  Aged Out    Meningococcal (ACWY) vaccine  Aged Out    Pneumococcal 0-64 years Vaccine  Aged Out       Assessment & Plan   Visit for preventive health examination  -     CBC with Auto Differential; Future  -     Comprehensive Metabolic Panel;  Future  -     Lipid Panel; Future  -     TSH; Future  -     T4, Free; Future  -     Microalbumin / Creatinine Urine Ratio; Future  Mixed hyperlipidemia  -     CBC with Auto Differential; Future  -     Comprehensive Metabolic Panel; Future  -     Lipid Panel; Future  -     TSH; Future  -     T4, Free; Future  -     Microalbumin / Creatinine Urine Ratio; Future  -     atorvastatin (LIPITOR) 20 MG tablet; Take 1 tablet by mouth nightly, Disp-90 tablet, R-3Normal  Gastroesophageal reflux disease, unspecified whether esophagitis present  -     CBC with Auto Differential; Future  -     Comprehensive Metabolic Panel; Future  -     Lipid Panel; Future  -     TSH; Future  -     T4, Free; Future  -     Microalbumin / Creatinine Urine Ratio; Future  Anxiety and depression  -     CBC with Auto Differential; Future  -     Comprehensive Metabolic Panel; Future  -     Lipid Panel; Future  -     TSH; Future  -     T4, Free; Future  -     Microalbumin / Creatinine Urine Ratio; Future  Obstructive sleep apnea on CPAP  -     CBC with Auto Differential; Future  -     Comprehensive Metabolic Panel; Future  -     Lipid Panel; Future  -     TSH; Future  -     T4, Free; Future  -     Microalbumin / Creatinine Urine Ratio; Future  Moderate episode of recurrent major depressive disorder (HCC)  Increased wellbutrin dose    -     buPROPion (WELLBUTRIN XL) 300 MG extended release tablet; Take 1 tablet by mouth every morning, Disp-90 tablet, R-3Normal  Family history of colon cancer requiring screening colonoscopy  -     External Referral To Gastroenterology  Encounter for well adult exam without abnormal findings  Return in about 8 weeks (around 12/7/2022) for diabetic follow up.          --Cristian Centeno APRN

## 2022-10-12 NOTE — PATIENT INSTRUCTIONS
Well Visit, Ages 25 to 72: Care Instructions  Well visits can help you stay healthy. Your doctor has checked your overall health and may have suggested ways to take good care of yourself. Your doctor also may have recommended tests. You can help prevent illness with healthy eating, good sleep, vaccinations, regular exercise, and other steps. Get the tests that you and your doctor decide on. Depending on your age and risks, examples might include screening for diabetes; hepatitis C; HIV; and cervical, breast, lung, and colon cancer. Screening helps find diseases before any symptoms appear. Eat healthy foods. Choose fruits, vegetables, whole grains, lean protein, and low-fat dairy foods. Limit saturated fat and reduce salt. Limit alcohol. Men should have no more than 2 drinks a day. Women should have no more than 1. For some people, no alcohol is the best choice. Exercise. Get at least 30 minutes of exercise on most days of the week. Walking can be a good choice. Reach and stay at your healthy weight. This will lower your risk for many health problems. Take care of your mental health. Try to stay connected with friends, family, and community, and find ways to manage stress. If you're feeling depressed or hopeless, talk to someone. A counselor can help. If you don't have a counselor, talk to your doctor. Talk to your doctor if you think you may have a problem with alcohol or drug use. This includes prescription medicines and illegal drugs. Avoid tobacco and nicotine: Don't smoke, vape, or chew. If you need help quitting, talk to your doctor. Practice safer sex. Getting tested, using condoms or dental dams, and limiting sex partners can help prevent STIs. Use birth control if it's important to you to prevent pregnancy. Talk with your doctor about your choices and what might be best for you. Prevent problems where you can.  Protect your skin from too much sun, wash your hands, brush your teeth twice a day, and wear a seat belt in the car. Where can you learn more? Go to https://chpepiceweb.[email or web address].[email or web address] org and sign in to your [employer] account. Enter P072 in the [facility] box to learn more about \"Well Visit, Ages 25 to 72: Care Instructions. \"     If you do not have an account, please click on the \"Sign Up Now\" link. Current as of: March 9, 2022               Content Version: 13.4  © 2006-2022 [employer]. Care instructions adapted under license by [facility] ([facility]). If you have questions about a medical condition or this instruction, always ask your healthcare professional. [place] [address] any warranty or liability for your use of this information. A Healthy Lifestyle: Care Instructions  A healthy lifestyle can help you feel good, have more energy, and stay at a weight that's healthy for you. You can share a healthy lifestyle with your friends and family. And you can do it on your own. Eat meals with your friends or family. You could try cooking together. Plan activities with other people. Go for a walk with a friend, try a free online fitness class, or join a sports league. Eat a variety of healthy foods. These include fruits, vegetables, whole grains, low-fat dairy, and lean protein. Choose healthy portions of food. You can use the Nutrition Facts label on food packages as a guide. Eat more fruits and vegetables. You could add vegetables to sandwiches or add fruit to cereal.  Drink water when you are thirsty. Limit soda, juice, and sports drinks. Try to exercise most days. Aim for at least 2½ hours of exercise each week. Keep moving. Work in the garden or take your dog on a walk. Use the stairs instead of the elevator. If you use tobacco or nicotine, try to quit. Ask your doctor about programs and medicines to help you quit. Limit alcohol. Men should have no more than 2 drinks a day. Women should have no more than 1.  For some people, no alcohol is the best choice. Follow-up care is a key part of your treatment and safety. Be sure to make and go to all appointments, and call your doctor if you are having problems. It's also a good idea to know your test results and keep a list of the medicines you take. Where can you learn more? Go to https://chpepiceweb.[email or web address] org and sign in to your [employer] account. Enter X693 in the [employer] box to learn more about \"A Healthy Lifestyle: Care Instructions. \"     If you do not have an account, please click on the \"Sign Up Now\" link. Current as of: March 9, 2022               Content Version: 13.4  © 2006-2022 Healthwise, Incorporated. Care instructions adapted under license by [facility] ([facility]). If you have questions about a medical condition or this instruction, always ask your healthcare professional. [address] any warranty or liability for your use of this information.

## 2022-10-28 DIAGNOSIS — F33.1 MODERATE EPISODE OF RECURRENT MAJOR DEPRESSIVE DISORDER (HCC): ICD-10-CM

## 2022-10-28 RX ORDER — BUPROPION HYDROCHLORIDE 150 MG/1
TABLET ORAL
Qty: 90 TABLET | Refills: 3 | Status: SHIPPED | OUTPATIENT
Start: 2022-10-28

## 2022-10-28 NOTE — TELEPHONE ENCOUNTER
Received fax from pharmacy requesting refill on pts medication(s). Pt was last seen in office on 10/12/2022  and has a follow up scheduled for 12/7/2022. Will send request to  Dr. Laury Severin  for authorization. Medication was already prescribed.      Requested Prescriptions     Pending Prescriptions Disp Refills    buPROPion (WELLBUTRIN XL) 150 MG extended release tablet [Pharmacy Med Name: BUPROPION HCL  MG TABLET] 90 tablet 3     Sig: TAKE 1 TABLET BY MOUTH EVERY DAY IN THE MORNING

## 2022-11-28 DIAGNOSIS — Z00.00 VISIT FOR PREVENTIVE HEALTH EXAMINATION: Primary | ICD-10-CM

## 2022-11-28 LAB
ALBUMIN SERPL-MCNC: 4.4 G/DL
ALP BLD-CCNC: 77 U/L
ALT SERPL-CCNC: 24 U/L
ANION GAP SERPL CALCULATED.3IONS-SCNC: 10 MMOL/L
AST SERPL-CCNC: 10 U/L
BASOPHILS ABSOLUTE: 0.02 /ΜL
BASOPHILS RELATIVE PERCENT: 0.3 %
BILIRUB SERPL-MCNC: 0.3 MG/DL (ref 0.1–1.4)
BUN BLDV-MCNC: 15 MG/DL
CALCIUM SERPL-MCNC: 9.4 MG/DL
CHLORIDE BLD-SCNC: 105 MMOL/L
CHOLESTEROL, TOTAL: 133 MG/DL
CHOLESTEROL/HDL RATIO: NORMAL
CO2: 29 MMOL/L
CREAT SERPL-MCNC: 1.25 MG/DL
EOSINOPHILS ABSOLUTE: 0.09 /ΜL
EOSINOPHILS RELATIVE PERCENT: 1.4 %
GFR CALCULATED: >60
GLUCOSE BLD-MCNC: 121 MG/DL
HCT VFR BLD CALC: 47.6 % (ref 41–53)
HDLC SERPL-MCNC: 50 MG/DL (ref 35–70)
HEMOGLOBIN: 15.9 G/DL (ref 13.5–17.5)
LDL CHOLESTEROL CALCULATED: 62 MG/DL (ref 0–160)
LYMPHOCYTES ABSOLUTE: 2.16 /ΜL
LYMPHOCYTES RELATIVE PERCENT: 34.3 %
MCH RBC QN AUTO: 31.2 PG
MCHC RBC AUTO-ENTMCNC: 33.4 G/DL
MCV RBC AUTO: 93.3 FL
MONOCYTES ABSOLUTE: 0.47 /ΜL
MONOCYTES RELATIVE PERCENT: 7.5 %
NEUTROPHILS ABSOLUTE: 3.53 /ΜL
NEUTROPHILS RELATIVE PERCENT: 56.2 %
NONHDLC SERPL-MCNC: NORMAL MG/DL
PDW BLD-RTO: 11.9 %
PLATELET # BLD: 215 K/ΜL
PMV BLD AUTO: 9 FL
POTASSIUM SERPL-SCNC: 4.4 MMOL/L
RBC # BLD: 5.1 10^6/ΜL
SODIUM BLD-SCNC: 140 MMOL/L
T4 FREE: 0.85
TOTAL PROTEIN: 7.5
TRIGL SERPL-MCNC: 104 MG/DL
TSH SERPL DL<=0.05 MIU/L-ACNC: 1.1 UIU/ML
VLDLC SERPL CALC-MCNC: NORMAL MG/DL
WBC # BLD: 6.3 10^3/ML

## 2022-11-29 ENCOUNTER — TELEPHONE (OUTPATIENT)
Dept: PRIMARY CARE CLINIC | Age: 37
End: 2022-11-29

## 2022-11-29 DIAGNOSIS — Z00.00 VISIT FOR PREVENTIVE HEALTH EXAMINATION: Primary | ICD-10-CM

## 2022-11-29 LAB
CREATININE, URINE: 29.7
MICROALBUMIN/CREAT 24H UR: <3 MG/G{CREAT}
MICROALBUMIN/CREAT UR-RTO: <10

## 2022-11-29 NOTE — TELEPHONE ENCOUNTER
----- Message from ROCKY Calvert sent at 11/28/2022  5:06 PM CST -----  Please call patient and let them know results.    Normal blood counts  Metabolic panel is normal except for mild elevated blood sugar at 121  Cholesterol is well controlled  Normal thyroid Mom called because she wants to discuss a few things with a nurse and/or Dr. Martinez before Markell's appointment on Friday. She can be reached at [phone].

## 2022-11-30 ENCOUNTER — TELEPHONE (OUTPATIENT)
Dept: PRIMARY CARE CLINIC | Age: 37
End: 2022-11-30

## 2022-11-30 NOTE — TELEPHONE ENCOUNTER
----- Message from ROCKY Flores sent at 11/29/2022  3:54 PM CST -----  Urine good no abnormal protein noted

## 2022-12-07 ENCOUNTER — OFFICE VISIT (OUTPATIENT)
Dept: PRIMARY CARE CLINIC | Age: 37
End: 2022-12-07
Payer: COMMERCIAL

## 2022-12-07 VITALS
HEART RATE: 99 BPM | TEMPERATURE: 97.8 F | DIASTOLIC BLOOD PRESSURE: 80 MMHG | WEIGHT: 218 LBS | HEIGHT: 72 IN | SYSTOLIC BLOOD PRESSURE: 136 MMHG | OXYGEN SATURATION: 98 % | BODY MASS INDEX: 29.53 KG/M2

## 2022-12-07 DIAGNOSIS — R73.01 IMPAIRED FASTING GLUCOSE: Primary | ICD-10-CM

## 2022-12-07 DIAGNOSIS — G47.33 OSA (OBSTRUCTIVE SLEEP APNEA): ICD-10-CM

## 2022-12-07 DIAGNOSIS — F33.1 MODERATE EPISODE OF RECURRENT MAJOR DEPRESSIVE DISORDER (HCC): ICD-10-CM

## 2022-12-07 LAB — HBA1C MFR BLD: 5.4 %

## 2022-12-07 PROCEDURE — 99214 OFFICE O/P EST MOD 30 MIN: CPT | Performed by: NURSE PRACTITIONER

## 2022-12-07 PROCEDURE — 83036 HEMOGLOBIN GLYCOSYLATED A1C: CPT | Performed by: NURSE PRACTITIONER

## 2022-12-07 RX ORDER — ARIPIPRAZOLE 5 MG/1
5 TABLET ORAL DAILY
Qty: 30 TABLET | Refills: 3 | Status: SHIPPED | OUTPATIENT
Start: 2022-12-07

## 2022-12-07 SDOH — ECONOMIC STABILITY: FOOD INSECURITY: WITHIN THE PAST 12 MONTHS, THE FOOD YOU BOUGHT JUST DIDN'T LAST AND YOU DIDN'T HAVE MONEY TO GET MORE.: NEVER TRUE

## 2022-12-07 SDOH — ECONOMIC STABILITY: FOOD INSECURITY: WITHIN THE PAST 12 MONTHS, YOU WORRIED THAT YOUR FOOD WOULD RUN OUT BEFORE YOU GOT MONEY TO BUY MORE.: NEVER TRUE

## 2022-12-07 ASSESSMENT — ENCOUNTER SYMPTOMS
CONSTIPATION: 0
NAUSEA: 0
VOMITING: 0
COUGH: 0
SORE THROAT: 0
TROUBLE SWALLOWING: 0
RHINORRHEA: 0
DIARRHEA: 0
ABDOMINAL PAIN: 0
SHORTNESS OF BREATH: 0

## 2022-12-07 ASSESSMENT — PATIENT HEALTH QUESTIONNAIRE - PHQ9
SUM OF ALL RESPONSES TO PHQ QUESTIONS 1-9: 12
5. POOR APPETITE OR OVEREATING: 1
3. TROUBLE FALLING OR STAYING ASLEEP: 0
2. FEELING DOWN, DEPRESSED OR HOPELESS: 2
1. LITTLE INTEREST OR PLEASURE IN DOING THINGS: 2
SUM OF ALL RESPONSES TO PHQ QUESTIONS 1-9: 12
4. FEELING TIRED OR HAVING LITTLE ENERGY: 3
9. THOUGHTS THAT YOU WOULD BE BETTER OFF DEAD, OR OF HURTING YOURSELF: 0
10. IF YOU CHECKED OFF ANY PROBLEMS, HOW DIFFICULT HAVE THESE PROBLEMS MADE IT FOR YOU TO DO YOUR WORK, TAKE CARE OF THINGS AT HOME, OR GET ALONG WITH OTHER PEOPLE: 1
SUM OF ALL RESPONSES TO PHQ QUESTIONS 1-9: 12
7. TROUBLE CONCENTRATING ON THINGS, SUCH AS READING THE NEWSPAPER OR WATCHING TELEVISION: 3
8. MOVING OR SPEAKING SO SLOWLY THAT OTHER PEOPLE COULD HAVE NOTICED. OR THE OPPOSITE, BEING SO FIGETY OR RESTLESS THAT YOU HAVE BEEN MOVING AROUND A LOT MORE THAN USUAL: 1
6. FEELING BAD ABOUT YOURSELF - OR THAT YOU ARE A FAILURE OR HAVE LET YOURSELF OR YOUR FAMILY DOWN: 0
SUM OF ALL RESPONSES TO PHQ9 QUESTIONS 1 & 2: 4
SUM OF ALL RESPONSES TO PHQ QUESTIONS 1-9: 12

## 2022-12-07 ASSESSMENT — SOCIAL DETERMINANTS OF HEALTH (SDOH): HOW HARD IS IT FOR YOU TO PAY FOR THE VERY BASICS LIKE FOOD, HOUSING, MEDICAL CARE, AND HEATING?: NOT HARD AT ALL

## 2022-12-07 NOTE — PROGRESS NOTES
Brad Francisco (:  1985) is a 39 y.o. male,Established patient, here for evaluation of the following chief complaint(s):  Diabetes (Here for follow up. )      ASSESSMENT/PLAN:    ICD-10-CM    1. Impaired fasting glucose  R73.01 POCT glycosylated hemoglobin (Hb A1C)      2. BRIAN (obstructive sleep apnea)  G47.33 DME Order for CPAP as OP      3. Moderate episode of recurrent major depressive disorder (HCC)  F33.1 ARIPiprazole (ABILIFY) 5 MG tablet          Return in about 4 weeks (around 2023) for for CPAP compliance. .    SUBJECTIVE/OBJECTIVE:  HPI  Here for follow up on health issues    Mood Disorder:    Medications:   Prozac-  and Wellbutrin- . Last visit wellbutrin dose was increased. Current complaints include: depressed mood and fatigue. Can not tell much difference with increasing the wellbutrin. He denies any other symptoms. Symptoms/signs of bindu: none. External stressors: none. Medication side effects: none. Hyperlipidemia:    Medications:   atorvastatin (Lipitor)   No new myalgias or GI upset on atorvastatin (Lipitor). No results found for: LABA1C  Lab Results   Component Value Date    CREATININE 1.25 2022     Lab Results   Component Value Date    ALT 24 2022    AST 10 2022     Lab Results   Component Value Date    CHOL 133 2022    TRIG 104 2022    HDL 50 2022    LDLCALC 62 2022        Review of labs dated 2022:  Normal blood counts  Metabolic panel is normal except for mild elevated blood sugar at 121  Cholesterol is well controlled  Normal thyroid    BRIAN  He had sleep study done 2021  Never did therapy, he would like to get started on CPAP     Had colonoscopy 2 weeks ago (Dr Shu Hernandez) - per patient report it was good. Has left foot injury, hurt at work. Following with Dr Funmi Castillo. Had MRI. Have been getting shots in the foot. Cant run due to the pain.        /80   Pulse 99   Temp 97.8 °F (36.6 °C) (Temporal) Ht 6' (1.829 m)   Wt 218 lb (98.9 kg)   SpO2 98%   BMI 29.57 kg/m²     Review of Systems   Constitutional:  Negative for activity change, appetite change, fatigue, fever and unexpected weight change. HENT:  Negative for ear pain, rhinorrhea, sore throat and trouble swallowing. Eyes:  Negative for visual disturbance. Respiratory:  Negative for cough and shortness of breath. Cardiovascular:  Negative for chest pain, palpitations and leg swelling. Gastrointestinal:  Negative for abdominal pain, constipation, diarrhea, nausea and vomiting. Genitourinary:  Negative for flank pain. Musculoskeletal:  Negative for arthralgias, myalgias, neck pain and neck stiffness. Neurological:  Negative for headaches. Psychiatric/Behavioral:  Positive for decreased concentration and dysphoric mood. Negative for sleep disturbance. The patient is not nervous/anxious. Physical Exam  Vitals reviewed. Constitutional:       Appearance: Normal appearance. HENT:      Head: Normocephalic and atraumatic. Cardiovascular:      Rate and Rhythm: Normal rate and regular rhythm. Pulses: Normal pulses. Heart sounds: Normal heart sounds. Pulmonary:      Effort: Pulmonary effort is normal.      Breath sounds: Normal breath sounds. Abdominal:      General: Bowel sounds are normal. There is no distension. Palpations: Abdomen is soft. Tenderness: There is no abdominal tenderness. There is no guarding. Musculoskeletal:      Cervical back: Normal range of motion and neck supple. Skin:     General: Skin is warm. Neurological:      Mental Status: He is alert and oriented to person, place, and time. An electronic signature was used to authenticate this note.     --Zoie NeighboursROCKY

## 2022-12-22 DIAGNOSIS — F41.9 ANXIETY AND DEPRESSION: ICD-10-CM

## 2022-12-22 DIAGNOSIS — F32.A ANXIETY AND DEPRESSION: ICD-10-CM

## 2022-12-22 RX ORDER — BUSPIRONE HYDROCHLORIDE 10 MG/1
TABLET ORAL
Qty: 180 TABLET | Refills: 1 | OUTPATIENT
Start: 2022-12-22

## 2022-12-30 DIAGNOSIS — F33.1 MODERATE EPISODE OF RECURRENT MAJOR DEPRESSIVE DISORDER (HCC): ICD-10-CM

## 2022-12-30 RX ORDER — ARIPIPRAZOLE 5 MG/1
5 TABLET ORAL DAILY
Qty: 90 TABLET | Refills: 3 | Status: SHIPPED | OUTPATIENT
Start: 2022-12-30

## 2023-01-14 DIAGNOSIS — F32.A ANXIETY AND DEPRESSION: ICD-10-CM

## 2023-01-14 DIAGNOSIS — F41.9 ANXIETY AND DEPRESSION: ICD-10-CM

## 2023-01-16 RX ORDER — BUSPIRONE HYDROCHLORIDE 10 MG/1
TABLET ORAL
Qty: 180 TABLET | Refills: 1 | OUTPATIENT
Start: 2023-01-16

## 2023-01-18 ENCOUNTER — OFFICE VISIT (OUTPATIENT)
Dept: FAMILY MEDICINE CLINIC | Age: 38
End: 2023-01-18
Payer: COMMERCIAL

## 2023-01-18 VITALS
BODY MASS INDEX: 30.79 KG/M2 | HEART RATE: 78 BPM | SYSTOLIC BLOOD PRESSURE: 130 MMHG | TEMPERATURE: 96.5 F | WEIGHT: 227 LBS | OXYGEN SATURATION: 99 % | DIASTOLIC BLOOD PRESSURE: 76 MMHG

## 2023-01-18 DIAGNOSIS — G47.33 OSA ON CPAP: Primary | ICD-10-CM

## 2023-01-18 DIAGNOSIS — R19.6 HALITOSIS: ICD-10-CM

## 2023-01-18 DIAGNOSIS — Z99.89 OSA ON CPAP: Primary | ICD-10-CM

## 2023-01-18 PROCEDURE — 99213 OFFICE O/P EST LOW 20 MIN: CPT | Performed by: NURSE PRACTITIONER

## 2023-01-18 ASSESSMENT — PATIENT HEALTH QUESTIONNAIRE - PHQ9
3. TROUBLE FALLING OR STAYING ASLEEP: 2
2. FEELING DOWN, DEPRESSED OR HOPELESS: 1
7. TROUBLE CONCENTRATING ON THINGS, SUCH AS READING THE NEWSPAPER OR WATCHING TELEVISION: 2
8. MOVING OR SPEAKING SO SLOWLY THAT OTHER PEOPLE COULD HAVE NOTICED. OR THE OPPOSITE, BEING SO FIGETY OR RESTLESS THAT YOU HAVE BEEN MOVING AROUND A LOT MORE THAN USUAL: 0
10. IF YOU CHECKED OFF ANY PROBLEMS, HOW DIFFICULT HAVE THESE PROBLEMS MADE IT FOR YOU TO DO YOUR WORK, TAKE CARE OF THINGS AT HOME, OR GET ALONG WITH OTHER PEOPLE: 1
1. LITTLE INTEREST OR PLEASURE IN DOING THINGS: 0
SUM OF ALL RESPONSES TO PHQ QUESTIONS 1-9: 5
SUM OF ALL RESPONSES TO PHQ9 QUESTIONS 1 & 2: 1
SUM OF ALL RESPONSES TO PHQ QUESTIONS 1-9: 5
SUM OF ALL RESPONSES TO PHQ QUESTIONS 1-9: 5
9. THOUGHTS THAT YOU WOULD BE BETTER OFF DEAD, OR OF HURTING YOURSELF: 0
6. FEELING BAD ABOUT YOURSELF - OR THAT YOU ARE A FAILURE OR HAVE LET YOURSELF OR YOUR FAMILY DOWN: 0
4. FEELING TIRED OR HAVING LITTLE ENERGY: 0
SUM OF ALL RESPONSES TO PHQ QUESTIONS 1-9: 5
5. POOR APPETITE OR OVEREATING: 0

## 2023-01-18 ASSESSMENT — ENCOUNTER SYMPTOMS
VOMITING: 0
TROUBLE SWALLOWING: 0
SORE THROAT: 0
RHINORRHEA: 0
NAUSEA: 0
ABDOMINAL PAIN: 0
COUGH: 0
SHORTNESS OF BREATH: 0
CONSTIPATION: 0
DIARRHEA: 0

## 2023-01-18 NOTE — PATIENT INSTRUCTIONS
Use cpap every night and reapply every time you wake up without it. May trying napping during the day when able and use CPAP when napping. Wearing it short intervals will help with long term goal of wearing thru the night. Turn humidity up on CPAP to help with dry mouth. Floss, gargle for halitosis.  (Bad breath)

## 2023-01-18 NOTE — PROGRESS NOTES
Troy Gregg (:  1985) is a 44 y.o. male,Established patient, here for evaluation of the following chief complaint(s):  1 Month Follow-Up (Here for follow up. Cpap causing dry mouth , getting around 4 hours of sleep )      ASSESSMENT/PLAN:    ICD-10-CM    1. BRIAN on CPAP  G47.33 Discussed various ways that he could try to get used to wearing CPAP routinely. Z99.89       2. Halitosis  R19.6 I discussed oral hygiene, flossing daily and gargling antiseptic rinses. Return in about 3 months (around 2023), or if symptoms worsen or fail to improve, for CPAP compliance. SUBJECTIVE/OBJECTIVE:  HPI  Here for follow up on CPAP   Wearing about 3 hours a night. Having problems with dry mouth. Report shows last 30 days 7 nights didn't use. Averaging 1 hour 43 minutes by report. Also, 3 nights out of 20 >4 hours. He is trying to get used to it. Also would like to discuss bad breath  Brushes teeth regularly  Flosses 1-2 times a week  Patient states his wife complains that his breath is bad and advised him to ask about it. /76   Pulse 78   Temp (!) 96.5 °F (35.8 °C) (Temporal)   Wt 227 lb (103 kg)   SpO2 99%   BMI 30.79 kg/m²     Review of Systems   Constitutional:  Negative for activity change, appetite change, fatigue, fever and unexpected weight change. HENT:  Negative for ear pain, rhinorrhea, sore throat and trouble swallowing. Eyes:  Negative for visual disturbance. Respiratory:  Negative for cough and shortness of breath. Cardiovascular:  Negative for chest pain, palpitations and leg swelling. Gastrointestinal:  Negative for abdominal pain, constipation, diarrhea, nausea and vomiting. Genitourinary:  Negative for flank pain. Musculoskeletal:  Negative for arthralgias, myalgias, neck pain and neck stiffness. Neurological:  Negative for headaches. Psychiatric/Behavioral:  Negative for decreased concentration and sleep disturbance.  The patient is not nervous/anxious. Physical Exam  Vitals reviewed. Constitutional:       Appearance: Normal appearance. HENT:      Head: Normocephalic. Nose: Nose normal.      Mouth/Throat:      Mouth: Mucous membranes are moist.      Pharynx: Oropharynx is clear. Eyes:      Conjunctiva/sclera: Conjunctivae normal.   Cardiovascular:      Rate and Rhythm: Normal rate and regular rhythm. Pulses: Normal pulses. Heart sounds: Normal heart sounds. Pulmonary:      Effort: Pulmonary effort is normal.      Breath sounds: Normal breath sounds. Abdominal:      Palpations: Abdomen is soft. Musculoskeletal:      Cervical back: Normal range of motion and neck supple. Skin:     General: Skin is warm. Neurological:      Mental Status: He is alert and oriented to person, place, and time. An electronic signature was used to authenticate this note.     --ROCKY Groves

## 2023-05-10 ENCOUNTER — PATIENT MESSAGE (OUTPATIENT)
Dept: FAMILY MEDICINE CLINIC | Age: 38
End: 2023-05-10

## 2023-05-10 NOTE — TELEPHONE ENCOUNTER
From: Shirley Velazquez  To: Christine Lewiscristofergene  Sent: 5/10/2023 11:20 AM CDT  Subject: Delbra Monroe Prescription Update     Ive been using the free samples you gave me of Asiya Gutierrez while we were trying to get the insurance to approve. Tomorrow is my last pill I have left from the sample packs, so Im checking in to see if had been approved by my insurance because I havent heard from them. My next appointment with you is May 25th. Thanks.     Saadia Castellon

## 2023-05-24 NOTE — PROGRESS NOTES
Karolyn Cole (:  1985) is a 44 y.o. male,Established patient, here for evaluation of the following chief complaint(s):  Follow-up (Medication follow up. Sees an effect, but not much help. )      ASSESSMENT/PLAN:    ICD-10-CM    1. Moderate episode of recurrent major depressive disorder (HCC)  F33.1 venlafaxine (EFFEXOR XR) 75 MG extended release capsule     Jason Painting MD, Psychiatry, [place]      2. JORDEN (generalized anxiety disorder)  F41.1 venlafaxine (EFFEXOR XR) 75 MG extended release capsule     Alissa Bond MD, Psychiatry, [facility]     hydrOXYzine pamoate (VISTARIL) 25 MG capsule    Offered counseling and he declined. Return in about 6 weeks (around 2023) for mood follow up. SUBJECTIVE/OBJECTIVE:  HPI  Here for follow up on moods. Mood Disorder:    Medications:   Prozac-  and vraylar. Last visit added the vraylar. depression and anxiety disorder. Current complaints include: still having some depression. He denies any other symptoms. Symptoms/signs of bindu: none. External stressors: none. Medication side effects: none. \"Symptoms have improved some but not much. Would like to come off medicine. \"  Denies SI.    \"Anxiety is bad too, dont know which is worse really\"  Has family hx of addiction (both parents)   PHQ-9 Total Score: 22 (2023  9:23 AM)  Thoughts that you would be better off dead, or of hurting yourself in some way: 0 (2023  9:23 AM)    JORDEN-7 SCREENING 2023   Feeling nervous, anxious, or on edge Nearly every day   Not being able to stop or control worrying Nearly every day   Worrying too much about different things Nearly every day   Trouble relaxing Nearly every day   Being so restless that it is hard to sit still More than half the days   Becoming easily annoyed or irritable Nearly every day   Feeling afraid as if something awful might happen Nearly every day   JORDEN-7 Total Score 20   How difficult have these problems

## 2023-05-25 ENCOUNTER — OFFICE VISIT (OUTPATIENT)
Dept: FAMILY MEDICINE CLINIC | Age: 38
End: 2023-05-25
Payer: COMMERCIAL

## 2023-05-25 VITALS
TEMPERATURE: 97 F | WEIGHT: 239.38 LBS | BODY MASS INDEX: 32.47 KG/M2 | OXYGEN SATURATION: 98 % | SYSTOLIC BLOOD PRESSURE: 125 MMHG | HEART RATE: 75 BPM | DIASTOLIC BLOOD PRESSURE: 82 MMHG

## 2023-05-25 DIAGNOSIS — F41.1 GAD (GENERALIZED ANXIETY DISORDER): ICD-10-CM

## 2023-05-25 DIAGNOSIS — F33.1 MODERATE EPISODE OF RECURRENT MAJOR DEPRESSIVE DISORDER (HCC): Primary | ICD-10-CM

## 2023-05-25 PROCEDURE — 99214 OFFICE O/P EST MOD 30 MIN: CPT | Performed by: NURSE PRACTITIONER

## 2023-05-25 RX ORDER — VENLAFAXINE HYDROCHLORIDE 37.5 MG/1
CAPSULE, EXTENDED RELEASE ORAL
Qty: 49 CAPSULE | Refills: 0 | Status: CANCELLED | OUTPATIENT
Start: 2023-05-25 | End: 2023-06-22

## 2023-05-25 RX ORDER — HYDROXYZINE PAMOATE 25 MG/1
25 CAPSULE ORAL 3 TIMES DAILY PRN
Qty: 90 CAPSULE | Refills: 0 | Status: SHIPPED | OUTPATIENT
Start: 2023-05-25 | End: 2023-06-24

## 2023-05-25 RX ORDER — VENLAFAXINE HYDROCHLORIDE 75 MG/1
75 CAPSULE, EXTENDED RELEASE ORAL DAILY
Qty: 30 CAPSULE | Refills: 3 | Status: SHIPPED | OUTPATIENT
Start: 2023-05-25

## 2023-05-25 SDOH — ECONOMIC STABILITY: HOUSING INSECURITY
IN THE LAST 12 MONTHS, WAS THERE A TIME WHEN YOU DID NOT HAVE A STEADY PLACE TO SLEEP OR SLEPT IN A SHELTER (INCLUDING NOW)?: NO

## 2023-05-25 SDOH — ECONOMIC STABILITY: INCOME INSECURITY: HOW HARD IS IT FOR YOU TO PAY FOR THE VERY BASICS LIKE FOOD, HOUSING, MEDICAL CARE, AND HEATING?: NOT HARD AT ALL

## 2023-05-25 SDOH — ECONOMIC STABILITY: FOOD INSECURITY: WITHIN THE PAST 12 MONTHS, THE FOOD YOU BOUGHT JUST DIDN'T LAST AND YOU DIDN'T HAVE MONEY TO GET MORE.: NEVER TRUE

## 2023-05-25 SDOH — ECONOMIC STABILITY: FOOD INSECURITY: WITHIN THE PAST 12 MONTHS, YOU WORRIED THAT YOUR FOOD WOULD RUN OUT BEFORE YOU GOT MONEY TO BUY MORE.: NEVER TRUE

## 2023-05-25 ASSESSMENT — PATIENT HEALTH QUESTIONNAIRE - PHQ9
10. IF YOU CHECKED OFF ANY PROBLEMS, HOW DIFFICULT HAVE THESE PROBLEMS MADE IT FOR YOU TO DO YOUR WORK, TAKE CARE OF THINGS AT HOME, OR GET ALONG WITH OTHER PEOPLE: 2
7. TROUBLE CONCENTRATING ON THINGS, SUCH AS READING THE NEWSPAPER OR WATCHING TELEVISION: 3
SUM OF ALL RESPONSES TO PHQ QUESTIONS 1-9: 22
4. FEELING TIRED OR HAVING LITTLE ENERGY: 3
3. TROUBLE FALLING OR STAYING ASLEEP: 2
2. FEELING DOWN, DEPRESSED OR HOPELESS: 3
1. LITTLE INTEREST OR PLEASURE IN DOING THINGS: 3
SUM OF ALL RESPONSES TO PHQ9 QUESTIONS 1 & 2: 6
SUM OF ALL RESPONSES TO PHQ QUESTIONS 1-9: 22
5. POOR APPETITE OR OVEREATING: 2
SUM OF ALL RESPONSES TO PHQ QUESTIONS 1-9: 22
6. FEELING BAD ABOUT YOURSELF - OR THAT YOU ARE A FAILURE OR HAVE LET YOURSELF OR YOUR FAMILY DOWN: 3
SUM OF ALL RESPONSES TO PHQ QUESTIONS 1-9: 22
9. THOUGHTS THAT YOU WOULD BE BETTER OFF DEAD, OR OF HURTING YOURSELF: 0
8. MOVING OR SPEAKING SO SLOWLY THAT OTHER PEOPLE COULD HAVE NOTICED. OR THE OPPOSITE, BEING SO FIGETY OR RESTLESS THAT YOU HAVE BEEN MOVING AROUND A LOT MORE THAN USUAL: 3

## 2023-05-25 ASSESSMENT — ENCOUNTER SYMPTOMS
TROUBLE SWALLOWING: 0
NAUSEA: 0
COUGH: 0
CONSTIPATION: 0
DIARRHEA: 0
SHORTNESS OF BREATH: 0
ABDOMINAL PAIN: 0
SORE THROAT: 0
RHINORRHEA: 0
VOMITING: 0

## 2023-05-25 ASSESSMENT — ANXIETY QUESTIONNAIRES
IF YOU CHECKED OFF ANY PROBLEMS ON THIS QUESTIONNAIRE, HOW DIFFICULT HAVE THESE PROBLEMS MADE IT FOR YOU TO DO YOUR WORK, TAKE CARE OF THINGS AT HOME, OR GET ALONG WITH OTHER PEOPLE: VERY DIFFICULT
6. BECOMING EASILY ANNOYED OR IRRITABLE: 3
3. WORRYING TOO MUCH ABOUT DIFFERENT THINGS: 3
4. TROUBLE RELAXING: 3
5. BEING SO RESTLESS THAT IT IS HARD TO SIT STILL: 2
GAD7 TOTAL SCORE: 20
1. FEELING NERVOUS, ANXIOUS, OR ON EDGE: 3
2. NOT BEING ABLE TO STOP OR CONTROL WORRYING: 3
7. FEELING AFRAID AS IF SOMETHING AWFUL MIGHT HAPPEN: 3

## 2023-05-26 ENCOUNTER — TELEPHONE (OUTPATIENT)
Dept: PSYCHIATRY | Age: 38
End: 2023-05-26

## 2023-05-26 NOTE — TELEPHONE ENCOUNTER
Called pt to schedule an appt from a referral    Scheduled with Thomas Hodgson for 06/01/23 @ 10.     Electronically signed by Henry Uribe MA on 5/26/2023 at 1:30 PM

## 2023-05-31 ENCOUNTER — TELEPHONE (OUTPATIENT)
Dept: PSYCHIATRY | Age: 38
End: 2023-05-31

## 2023-05-31 NOTE — TELEPHONE ENCOUNTER
Pt called back to confirm hs appt for 06/01/23    Electronically signed by Kisha Prieto MA on 5/31/2023 at 4:16 PM

## 2023-05-31 NOTE — TELEPHONE ENCOUNTER
Called pt for appointment reminder.   -left voicemail, requesting a return call      Electronically signed by Pieter Lo MA on 5/31/2023 at 4:08 PM

## 2023-06-01 ENCOUNTER — OFFICE VISIT (OUTPATIENT)
Dept: PSYCHIATRY | Age: 38
End: 2023-06-01

## 2023-06-01 VITALS
HEIGHT: 72 IN | TEMPERATURE: 97.1 F | HEART RATE: 79 BPM | WEIGHT: 224.7 LBS | SYSTOLIC BLOOD PRESSURE: 143 MMHG | DIASTOLIC BLOOD PRESSURE: 89 MMHG | BODY MASS INDEX: 30.43 KG/M2 | OXYGEN SATURATION: 98 %

## 2023-06-01 DIAGNOSIS — F12.21: ICD-10-CM

## 2023-06-01 DIAGNOSIS — Z99.89 OBSTRUCTIVE SLEEP APNEA TREATED WITH CONTINUOUS POSITIVE AIRWAY PRESSURE (CPAP): ICD-10-CM

## 2023-06-01 DIAGNOSIS — F41.9 ANXIETY DISORDER, UNSPECIFIED TYPE: ICD-10-CM

## 2023-06-01 DIAGNOSIS — F33.1 MODERATE EPISODE OF RECURRENT MAJOR DEPRESSIVE DISORDER (HCC): Primary | ICD-10-CM

## 2023-06-01 DIAGNOSIS — G47.33 OBSTRUCTIVE SLEEP APNEA TREATED WITH CONTINUOUS POSITIVE AIRWAY PRESSURE (CPAP): ICD-10-CM

## 2023-06-01 ASSESSMENT — PATIENT HEALTH QUESTIONNAIRE - PHQ9
6. FEELING BAD ABOUT YOURSELF - OR THAT YOU ARE A FAILURE OR HAVE LET YOURSELF OR YOUR FAMILY DOWN: 3
3. TROUBLE FALLING OR STAYING ASLEEP: 0
7. TROUBLE CONCENTRATING ON THINGS, SUCH AS READING THE NEWSPAPER OR WATCHING TELEVISION: 3
SUM OF ALL RESPONSES TO PHQ QUESTIONS 1-9: 17
8. MOVING OR SPEAKING SO SLOWLY THAT OTHER PEOPLE COULD HAVE NOTICED. OR THE OPPOSITE, BEING SO FIGETY OR RESTLESS THAT YOU HAVE BEEN MOVING AROUND A LOT MORE THAN USUAL: 2
5. POOR APPETITE OR OVEREATING: 0
SUM OF ALL RESPONSES TO PHQ QUESTIONS 1-9: 17
1. LITTLE INTEREST OR PLEASURE IN DOING THINGS: 3
SUM OF ALL RESPONSES TO PHQ QUESTIONS 1-9: 17
9. THOUGHTS THAT YOU WOULD BE BETTER OFF DEAD, OR OF HURTING YOURSELF: 0
SUM OF ALL RESPONSES TO PHQ QUESTIONS 1-9: 17
10. IF YOU CHECKED OFF ANY PROBLEMS, HOW DIFFICULT HAVE THESE PROBLEMS MADE IT FOR YOU TO DO YOUR WORK, TAKE CARE OF THINGS AT HOME, OR GET ALONG WITH OTHER PEOPLE: 2
4. FEELING TIRED OR HAVING LITTLE ENERGY: 3
2. FEELING DOWN, DEPRESSED OR HOPELESS: 3
SUM OF ALL RESPONSES TO PHQ9 QUESTIONS 1 & 2: 6

## 2023-06-01 NOTE — PROGRESS NOTES
PSYCHIATRIC EVALUATION    Date of Service:  6/1/23     No chief complaint on file. HISTORY OF PRESENT ILLNESS  The patient is a 44 y.o. {Race/ethnicity:00651}  male who is here for psychiatric evaluation due to continual complaints of ***    Today patient states, \" ***  Has sleep apnea, using CPAP    Sleep: ***    Pt denies excessive spending ***, mood swings ***, irritability ***, manic or hypomanic episodes. Pt denies SI, HI, Auditory, visual, and tactile hallucinations at this time. Appetite: {eating more than usual, or less than usual}    Caffeine use: ***    Support: {psy support:47970}    PSYCHIATRIC HISTORY  Previous diagnoses:   Suicide attempts/gestures: Denies   Prior hospitalizations: Denies    Prior Therapy:    PREVIOUS MED TRIALS  ***  Effexor  Prozac  Vraylar  Abilify  Zoloft  Buspar  Wellbutrin      SUBSTANCE USE HISTORY  Alcohol: ***  Illicit drug use: denies ***  Marijuana: denies ***  Tobacco: denies ***  Vape: denies ***    FAMILY PSYCHIATRIC HISTORY  ***      Social History  Marital status- ***  Trauma and/or Abuse - ***  Children- ***  Legal - ***  Work History - ***  Education - ***   status - ***    BP: There were no vitals taken for this visit. {RUSH POSITIVE/NEGATIVE:0826356078} history of seizures. {RUSH POSITIVE/NEGATIVE:7609808691} history of head trauma. See past medical history below. Information obtained via patient and chart review    PCP is  MONTEZ Euceda DO    Allergies: Patient has no known allergies.       Review of Systems - 14 point review:  Negative except for stated    Constitutional: (fevers, chills, night sweats, wt loss/gain, change in appetite, fatigue, somnolence)    HEENT: (ear pain or discharge, hearing loss, ear ringing, sinus pressure, nosebleed, nasal discharge, sore throat, oral sores, tooth pain, bleeding gums, hoarse voice, neck pain)      Cardiovascular: (HTN, chest pain, palpitations, leg swelling, leg pain with
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 positive history of head trauma concussion in 4th grade. See past medical history below. Information obtained via patient and chart review    PCP is  MONTEZ Lugo DO    Allergies: Patient has no known allergies.       Review of Systems - 14 point review:  Negative except for stated    Constitutional: (fevers, chills, night sweats, wt loss/gain, change in appetite, fatigue, somnolence)    HEENT: (ear pain or discharge, hearing loss, ear ringing, sinus pressure, nosebleed, nasal discharge, sore throat, oral sores, tooth pain, bleeding gums, hoarse voice, neck pain)      Cardiovascular: (HTN, chest pain, palpitations, leg swelling, leg pain with walking)    Respiratory: (cough, wheezing, snoring, SOB with activity (dyspnea), SOB while lying flat (orthopnea), awakening with severe SOB (paroxysmal nocturnal dyspnea))    Gastrointestinal: (NVD, constipation, abdominal pain, bright red stools, black tarry stools, stool incontinence)     Genitourinary:  (pelvic pain, burning or frequency of urination, urinary urgency, blood in urine incomplete bladder emptying, urinary incontinence, STD; MEN: testicular pain or swelling, erectile dysfunction; WOMEN: LMP, heavy menstrual bleeding (menorrhagia), irregular periods, postmenopausal bleeding, menstrual pain (dymenorrhea, vaginal discharge)    Musculoskeletal: (bone pain/fracture, joint pain or swelling, musle

## 2023-06-07 ENCOUNTER — TELEPHONE (OUTPATIENT)
Dept: PSYCHIATRY | Age: 38
End: 2023-06-07

## 2023-06-07 NOTE — TELEPHONE ENCOUNTER
Sent a referral to [facility] in [place] for therapy.     Electronically signed by Luis Eduardo Hsieh MA on 6/7/2023 at 12:14 PM

## 2023-06-16 DIAGNOSIS — F41.1 GAD (GENERALIZED ANXIETY DISORDER): ICD-10-CM

## 2023-06-16 DIAGNOSIS — F33.1 MODERATE EPISODE OF RECURRENT MAJOR DEPRESSIVE DISORDER (HCC): ICD-10-CM

## 2023-06-19 RX ORDER — VENLAFAXINE HYDROCHLORIDE 75 MG/1
75 CAPSULE, EXTENDED RELEASE ORAL DAILY
Qty: 90 CAPSULE | Refills: 3 | Status: SHIPPED | OUTPATIENT
Start: 2023-06-19

## 2023-06-19 RX ORDER — HYDROXYZINE PAMOATE 25 MG/1
25 CAPSULE ORAL 3 TIMES DAILY PRN
Qty: 90 CAPSULE | Refills: 0 | Status: SHIPPED | OUTPATIENT
Start: 2023-06-19 | End: 2023-07-19

## 2023-06-26 ENCOUNTER — TELEPHONE (OUTPATIENT)
Dept: PSYCHIATRY | Age: 38
End: 2023-06-26

## 2023-07-19 DIAGNOSIS — F41.1 GAD (GENERALIZED ANXIETY DISORDER): ICD-10-CM

## 2023-07-19 RX ORDER — HYDROXYZINE PAMOATE 25 MG/1
25 CAPSULE ORAL 3 TIMES DAILY PRN
Qty: 90 CAPSULE | Refills: 0 | Status: SHIPPED | OUTPATIENT
Start: 2023-07-19 | End: 2023-08-18

## 2023-07-19 NOTE — TELEPHONE ENCOUNTER
Received fax from pharmacy requesting refill on pts medication(s). Pt was last seen in office on 5/25/2023  and has a follow up scheduled for Visit date not found. Will send request to  Solomon Zavala  for authorization. Requested Prescriptions     Pending Prescriptions Disp Refills    hydrOXYzine pamoate (VISTARIL) 25 MG capsule [Pharmacy Med Name: HYDROXYZINE ADITYA 25 MG CAP] 90 capsule 0     Sig: TAKE 1 CAPSULE BY MOUTH 3 TIMES DAILY AS NEEDED FOR ANXIETY.

## 2023-08-02 DIAGNOSIS — F41.1 GAD (GENERALIZED ANXIETY DISORDER): ICD-10-CM

## 2023-08-02 RX ORDER — HYDROXYZINE PAMOATE 25 MG/1
25 CAPSULE ORAL 3 TIMES DAILY PRN
Qty: 270 CAPSULE | Refills: 0 | Status: SHIPPED | OUTPATIENT
Start: 2023-08-02 | End: 2023-10-31

## 2023-08-02 NOTE — TELEPHONE ENCOUNTER
Received fax from pharmacy requesting refill on pts medication(s). Pt was last seen in office on 5/25/2023  and has a follow up scheduled for Visit date not found. Will send request to  Dr. Emilio Dutta  for patient.      Requested Prescriptions     Pending Prescriptions Disp Refills    hydrOXYzine pamoate (VISTARIL) 25 MG capsule 270 capsule 0     Sig: Take 1 capsule by mouth 3 times daily as needed for Anxiety        Pharmacy request 90 day RX

## 2023-09-06 DIAGNOSIS — K21.9 GASTROESOPHAGEAL REFLUX DISEASE, UNSPECIFIED WHETHER ESOPHAGITIS PRESENT: ICD-10-CM

## 2023-09-06 RX ORDER — PANTOPRAZOLE SODIUM 40 MG/1
40 TABLET, DELAYED RELEASE ORAL DAILY
Qty: 90 TABLET | Refills: 3 | Status: SHIPPED | OUTPATIENT
Start: 2023-09-06

## 2023-09-06 RX ORDER — PANTOPRAZOLE SODIUM 40 MG/1
40 TABLET, DELAYED RELEASE ORAL
Qty: 90 TABLET | Refills: 3 | Status: SHIPPED | OUTPATIENT
Start: 2023-09-06 | End: 2023-09-08 | Stop reason: SDUPTHER

## 2023-09-06 NOTE — TELEPHONE ENCOUNTER
Received fax from pharmacy requesting refill on pts medication(s). Pt was last seen in office on 5/25/2023  and has a follow up scheduled for Visit date not found. Will send request to  Dr. Asa Yarbrough  for authorization.      Requested Prescriptions     Pending Prescriptions Disp Refills    pantoprazole (PROTONIX) 40 MG tablet [Pharmacy Med Name: PANTOPRAZOLE SOD DR 40 MG TAB] 90 tablet 3     Sig: Take 1 tablet by mouth every morning (before breakfast)

## 2023-09-06 NOTE — TELEPHONE ENCOUNTER
Received fax from pharmacy requesting refill on pts medication(s). Pt was last seen in office on 5/25/2023  and has a follow up scheduled for Visit date not found. Will send request to  Dr. Atlas Dakins  for authorization.      Requested Prescriptions     Pending Prescriptions Disp Refills    pantoprazole (PROTONIX) 40 MG tablet 90 tablet 3     Sig: Take 1 tablet by mouth daily

## 2023-09-07 ENCOUNTER — PATIENT MESSAGE (OUTPATIENT)
Dept: FAMILY MEDICINE CLINIC | Age: 38
End: 2023-09-07

## 2023-09-07 DIAGNOSIS — E78.2 MIXED HYPERLIPIDEMIA: ICD-10-CM

## 2023-09-07 RX ORDER — ATORVASTATIN CALCIUM 20 MG/1
20 TABLET, FILM COATED ORAL NIGHTLY
Qty: 90 TABLET | Refills: 3 | Status: SHIPPED | OUTPATIENT
Start: 2023-09-07

## 2023-09-07 NOTE — TELEPHONE ENCOUNTER
Last OV 5/25/2023  Next OV Visit date not found      Requested Prescriptions     Pending Prescriptions Disp Refills    atorvastatin (LIPITOR) 20 MG tablet [Pharmacy Med Name: ATORVASTATIN 20 MG TABLET] 90 tablet 3     Sig: TAKE 1 TABLET BY MOUTH NIGHTLY

## 2023-09-07 NOTE — TELEPHONE ENCOUNTER
From: Martha Brice  To: Kaylynn Kaye  Sent: 9/7/2023 12:04 AM CDT  Subject: Hold on medication    Today was the first day Yang missed my Pantoprazole medication because a physicians approval has been placed on it before it will be refilled. Tonight my heartburn is so bad I cannot sleep. Can you please take care of this so I can continue with my medication as usual? Thanks.

## 2024-09-05 DIAGNOSIS — K21.9 GASTROESOPHAGEAL REFLUX DISEASE, UNSPECIFIED WHETHER ESOPHAGITIS PRESENT: ICD-10-CM

## 2024-09-05 RX ORDER — PANTOPRAZOLE SODIUM 40 MG/1
40 TABLET, DELAYED RELEASE ORAL DAILY
Qty: 90 TABLET | Refills: 3 | Status: SHIPPED | OUTPATIENT
Start: 2024-09-05

## 2024-09-05 NOTE — TELEPHONE ENCOUNTER
Received fax from pharmacy requesting refill on pts medication(s). Called patient to schedule an appointment, left voicemail. Pt was last seen in office on 5/25/2023  and has a follow up scheduled for Visit date not found. Will send request to  Dr. Hurley  for authorization.     Requested Prescriptions     Pending Prescriptions Disp Refills    pantoprazole (PROTONIX) 40 MG tablet [Pharmacy Med Name: PANTOPRAZOLE SOD DR 40 MG TAB] 90 tablet 3     Sig: TAKE 1 TABLET BY MOUTH EVERY DAY

## 2024-11-15 DIAGNOSIS — E78.2 MIXED HYPERLIPIDEMIA: ICD-10-CM

## 2024-11-15 RX ORDER — ATORVASTATIN CALCIUM 20 MG/1
20 TABLET, FILM COATED ORAL NIGHTLY
Qty: 30 TABLET | Refills: 0 | Status: SHIPPED | OUTPATIENT
Start: 2024-11-15

## 2024-11-15 NOTE — TELEPHONE ENCOUNTER
Received fax from pharmacy requesting refill on pts medication(s). Pt was last seen in office on 5/25/2023  and has a follow up scheduled for Visit date not found. Will send request to  Gene Wallace  for patient.     Requested Prescriptions     Pending Prescriptions Disp Refills    atorvastatin (LIPITOR) 20 MG tablet [Pharmacy Med Name: ATORVASTATIN 20 MG TABLET] 90 tablet 3     Sig: TAKE 1 TABLET BY MOUTH EVERY DAY AT NIGHT     LVM for pt to schedule annual exam

## 2024-11-24 ASSESSMENT — PATIENT HEALTH QUESTIONNAIRE - PHQ9
2. FEELING DOWN, DEPRESSED OR HOPELESS: NOT AT ALL
1. LITTLE INTEREST OR PLEASURE IN DOING THINGS: SEVERAL DAYS
10. IF YOU CHECKED OFF ANY PROBLEMS, HOW DIFFICULT HAVE THESE PROBLEMS MADE IT FOR YOU TO DO YOUR WORK, TAKE CARE OF THINGS AT HOME, OR GET ALONG WITH OTHER PEOPLE: SOMEWHAT DIFFICULT
4. FEELING TIRED OR HAVING LITTLE ENERGY: SEVERAL DAYS
5. POOR APPETITE OR OVEREATING: NOT AT ALL
6. FEELING BAD ABOUT YOURSELF - OR THAT YOU ARE A FAILURE OR HAVE LET YOURSELF OR YOUR FAMILY DOWN: NOT AT ALL
7. TROUBLE CONCENTRATING ON THINGS, SUCH AS READING THE NEWSPAPER OR WATCHING TELEVISION: MORE THAN HALF THE DAYS
9. THOUGHTS THAT YOU WOULD BE BETTER OFF DEAD, OR OF HURTING YOURSELF: NOT AT ALL
8. MOVING OR SPEAKING SO SLOWLY THAT OTHER PEOPLE COULD HAVE NOTICED. OR THE OPPOSITE - BEING SO FIDGETY OR RESTLESS THAT YOU HAVE BEEN MOVING AROUND A LOT MORE THAN USUAL: NOT AT ALL
SUM OF ALL RESPONSES TO PHQ QUESTIONS 1-9: 4
7. TROUBLE CONCENTRATING ON THINGS, SUCH AS READING THE NEWSPAPER OR WATCHING TELEVISION: MORE THAN HALF THE DAYS
6. FEELING BAD ABOUT YOURSELF - OR THAT YOU ARE A FAILURE OR HAVE LET YOURSELF OR YOUR FAMILY DOWN: NOT AT ALL
SUM OF ALL RESPONSES TO PHQ QUESTIONS 1-9: 4
5. POOR APPETITE OR OVEREATING: NOT AT ALL
10. IF YOU CHECKED OFF ANY PROBLEMS, HOW DIFFICULT HAVE THESE PROBLEMS MADE IT FOR YOU TO DO YOUR WORK, TAKE CARE OF THINGS AT HOME, OR GET ALONG WITH OTHER PEOPLE: SOMEWHAT DIFFICULT
4. FEELING TIRED OR HAVING LITTLE ENERGY: SEVERAL DAYS
3. TROUBLE FALLING OR STAYING ASLEEP: NOT AT ALL
SUM OF ALL RESPONSES TO PHQ QUESTIONS 1-9: 4
3. TROUBLE FALLING OR STAYING ASLEEP: NOT AT ALL
1. LITTLE INTEREST OR PLEASURE IN DOING THINGS: SEVERAL DAYS
9. THOUGHTS THAT YOU WOULD BE BETTER OFF DEAD, OR OF HURTING YOURSELF: NOT AT ALL
SUM OF ALL RESPONSES TO PHQ9 QUESTIONS 1 & 2: 1
2. FEELING DOWN, DEPRESSED OR HOPELESS: NOT AT ALL
SUM OF ALL RESPONSES TO PHQ QUESTIONS 1-9: 4
8. MOVING OR SPEAKING SO SLOWLY THAT OTHER PEOPLE COULD HAVE NOTICED. OR THE OPPOSITE, BEING SO FIGETY OR RESTLESS THAT YOU HAVE BEEN MOVING AROUND A LOT MORE THAN USUAL: NOT AT ALL
SUM OF ALL RESPONSES TO PHQ QUESTIONS 1-9: 4

## 2024-11-27 ENCOUNTER — OFFICE VISIT (OUTPATIENT)
Dept: FAMILY MEDICINE CLINIC | Age: 39
End: 2024-11-27
Payer: COMMERCIAL

## 2024-11-27 VITALS
WEIGHT: 226 LBS | HEART RATE: 97 BPM | TEMPERATURE: 97.2 F | SYSTOLIC BLOOD PRESSURE: 128 MMHG | HEIGHT: 72 IN | BODY MASS INDEX: 30.61 KG/M2 | DIASTOLIC BLOOD PRESSURE: 76 MMHG | OXYGEN SATURATION: 98 %

## 2024-11-27 DIAGNOSIS — F41.1 GAD (GENERALIZED ANXIETY DISORDER): ICD-10-CM

## 2024-11-27 DIAGNOSIS — R07.9 CHEST PAIN, UNSPECIFIED TYPE: ICD-10-CM

## 2024-11-27 DIAGNOSIS — R73.01 IMPAIRED FASTING GLUCOSE: ICD-10-CM

## 2024-11-27 DIAGNOSIS — Z00.00 ROUTINE PHYSICAL EXAMINATION: Primary | ICD-10-CM

## 2024-11-27 DIAGNOSIS — F33.42 RECURRENT MAJOR DEPRESSIVE DISORDER, IN FULL REMISSION (HCC): ICD-10-CM

## 2024-11-27 DIAGNOSIS — Z00.00 ROUTINE PHYSICAL EXAMINATION: ICD-10-CM

## 2024-11-27 DIAGNOSIS — E78.2 MIXED HYPERLIPIDEMIA: ICD-10-CM

## 2024-11-27 DIAGNOSIS — K21.9 GASTROESOPHAGEAL REFLUX DISEASE, UNSPECIFIED WHETHER ESOPHAGITIS PRESENT: ICD-10-CM

## 2024-11-27 DIAGNOSIS — G47.33 OSA (OBSTRUCTIVE SLEEP APNEA): ICD-10-CM

## 2024-11-27 LAB
ALBUMIN SERPL-MCNC: 4.6 G/DL (ref 3.5–5.2)
ALP SERPL-CCNC: 65 U/L (ref 40–129)
ALT SERPL-CCNC: 8 U/L (ref 5–41)
ANION GAP SERPL CALCULATED.3IONS-SCNC: 13 MMOL/L (ref 7–19)
AST SERPL-CCNC: 15 U/L (ref 5–40)
BASOPHILS # BLD: 0 K/UL (ref 0–0.2)
BASOPHILS NFR BLD: 0.3 % (ref 0–1)
BILIRUB SERPL-MCNC: 0.4 MG/DL (ref 0.2–1.2)
BUN SERPL-MCNC: 15 MG/DL (ref 6–20)
CALCIUM SERPL-MCNC: 9.6 MG/DL (ref 8.6–10)
CHLORIDE SERPL-SCNC: 101 MMOL/L (ref 98–111)
CHOLEST SERPL-MCNC: 102 MG/DL (ref 0–199)
CO2 SERPL-SCNC: 27 MMOL/L (ref 22–29)
CREAT SERPL-MCNC: 1.2 MG/DL (ref 0.7–1.2)
CREAT UR-MCNC: 257.4 MG/DL (ref 39–259)
EOSINOPHIL # BLD: 0.2 K/UL (ref 0–0.6)
EOSINOPHIL NFR BLD: 2.9 % (ref 0–5)
ERYTHROCYTE [DISTWIDTH] IN BLOOD BY AUTOMATED COUNT: 11.8 % (ref 11.5–14.5)
GLUCOSE SERPL-MCNC: 86 MG/DL (ref 70–99)
HBA1C MFR BLD: 5.6 % (ref 4–5.6)
HCT VFR BLD AUTO: 49.6 % (ref 42–52)
HDLC SERPL-MCNC: 37 MG/DL (ref 40–60)
HGB BLD-MCNC: 16 G/DL (ref 14–18)
IMM GRANULOCYTES # BLD: 0 K/UL
LDLC SERPL CALC-MCNC: 51 MG/DL
LYMPHOCYTES # BLD: 2.1 K/UL (ref 1.1–4.5)
LYMPHOCYTES NFR BLD: 33.8 % (ref 20–40)
MCH RBC QN AUTO: 30.4 PG (ref 27–31)
MCHC RBC AUTO-ENTMCNC: 32.3 G/DL (ref 33–37)
MCV RBC AUTO: 94.3 FL (ref 80–94)
MICROALBUMIN UR-MCNC: <1.2 MG/DL (ref 0–1.99)
MICROALBUMIN/CREAT UR-RTO: NORMAL MG/G
MONOCYTES # BLD: 0.5 K/UL (ref 0–0.9)
MONOCYTES NFR BLD: 7.8 % (ref 0–10)
NEUTROPHILS # BLD: 3.4 K/UL (ref 1.5–7.5)
NEUTS SEG NFR BLD: 54.9 % (ref 50–65)
PLATELET # BLD AUTO: 208 K/UL (ref 130–400)
PMV BLD AUTO: 9.5 FL (ref 9.4–12.4)
POTASSIUM SERPL-SCNC: 3.5 MMOL/L (ref 3.5–5)
PROT SERPL-MCNC: 7.3 G/DL (ref 6.4–8.3)
RBC # BLD AUTO: 5.26 M/UL (ref 4.7–6.1)
SODIUM SERPL-SCNC: 141 MMOL/L (ref 136–145)
T4 FREE SERPL-MCNC: 1.43 NG/DL (ref 0.93–1.7)
TRIGL SERPL-MCNC: 69 MG/DL (ref 0–149)
TSH SERPL DL<=0.005 MIU/L-ACNC: 1.24 UIU/ML (ref 0.27–4.2)
WBC # BLD AUTO: 6.1 K/UL (ref 4.8–10.8)

## 2024-11-27 PROCEDURE — 99395 PREV VISIT EST AGE 18-39: CPT | Performed by: NURSE PRACTITIONER

## 2024-11-27 SDOH — ECONOMIC STABILITY: FOOD INSECURITY: WITHIN THE PAST 12 MONTHS, THE FOOD YOU BOUGHT JUST DIDN'T LAST AND YOU DIDN'T HAVE MONEY TO GET MORE.: NEVER TRUE

## 2024-11-27 SDOH — ECONOMIC STABILITY: FOOD INSECURITY: WITHIN THE PAST 12 MONTHS, YOU WORRIED THAT YOUR FOOD WOULD RUN OUT BEFORE YOU GOT MONEY TO BUY MORE.: NEVER TRUE

## 2024-11-27 SDOH — ECONOMIC STABILITY: INCOME INSECURITY: HOW HARD IS IT FOR YOU TO PAY FOR THE VERY BASICS LIKE FOOD, HOUSING, MEDICAL CARE, AND HEATING?: NOT HARD AT ALL

## 2024-11-27 ASSESSMENT — ENCOUNTER SYMPTOMS
ABDOMINAL PAIN: 0
CONSTIPATION: 0
TROUBLE SWALLOWING: 0
VOMITING: 0
NAUSEA: 0
COUGH: 0
RHINORRHEA: 0
SHORTNESS OF BREATH: 0
DIARRHEA: 0
SORE THROAT: 0

## 2024-11-27 NOTE — PROGRESS NOTES
2024    Ralf Mane (:  1985) is a 38 y.o. male, here for a preventive medicine evaluation.    Subjective   Eyes:   years  Dentist: routinely   Skin:  no concerns  Prostate cancer screening: no family hx of prostate cancer  Nocturia/IPPS: 2 - 3 times per night   Stream:normal  ED:  no concerns  Colonoscopy:  Paternal grandmother and father had colon cancer, he was previously referred to GI.   No colonoscopy on file   No cologuard on file  No FIT/FOBT on file   No flexible sigmoidoscopy on file  Exercise:   5 days a week. He is virtual     Body Image: no concerns  Diet and Nutr: no concerns   MVI:  none  Supplements:  none  Depression: no - have improved dramatically.   Fall:  none  EOL:  n/a  Tobacco:  none   Substance:  social alcohol,  and marijuana/CBD daily  He has medical card for the marijuana for chronic left foot pain.   Immunizations: influenza  Sleep good  Cognition good    Health Maintenance needs: age appropriate vaccines not already given (declines)    Anxiety and depression:  Symptoms: quit taking medicine. Moods dramatically improved with new job.       GERD:              Protonix 40 mg daily  Symptoms: denies dysphagia, controlled on medications.      HLP  lipitor  No concerns  Lab Results   Component Value Date    CHOL 133 2022    TRIG 104 2022    HDL 50 2022    LDL 62 2022     BRIAN  Not using anymore.     Chest pain  Muscle cramp pain in chest. It has been more noticeable the last month.   He denies lifting weights. Body weight exercises occasional.   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 He started using vape as primary way of consuming the CBD/marijuana.       Patient Active Problem List   Diagnosis    Mixed hyperlipidemia    Moderate episode of recurrent major depressive disorder (HCC)    Anxiety    BRIAN (obstructive sleep apnea)       Review of Systems   Constitutional:  
Cresencio

## 2024-11-27 NOTE — PATIENT INSTRUCTIONS
where you can. Protect your skin from too much sun, wash your hands, brush your teeth twice a day, and wear a seat belt in the car.   Where can you learn more?  Go to [email or web address] and enter P072 to learn more about \"Well Visit, Ages 18 to 65: Care Instructions.\"  Current as of: August 6, 2023  Content Version: 14.2  © 2024 [employer].   Care instructions adapted under license by [employer]. If you have questions about a medical condition or this instruction, always ask your healthcare professional. [employer] disclaims any warranty or liability for your use of this information.

## 2024-11-27 NOTE — ASSESSMENT & PLAN NOTE
Orders:    CBC with Auto Differential; Future    Comprehensive Metabolic Panel; Future    Lipid Panel; Future

## 2024-12-02 ENCOUNTER — TELEPHONE (OUTPATIENT)
Dept: FAMILY MEDICINE CLINIC | Age: 39
End: 2024-12-02

## 2024-12-04 DIAGNOSIS — R07.9 CHEST PAIN, UNSPECIFIED TYPE: ICD-10-CM

## 2024-12-04 PROCEDURE — 93000 ELECTROCARDIOGRAM COMPLETE: CPT | Performed by: NURSE PRACTITIONER

## 2024-12-08 DIAGNOSIS — E78.2 MIXED HYPERLIPIDEMIA: ICD-10-CM

## 2024-12-09 RX ORDER — ATORVASTATIN CALCIUM 20 MG/1
20 TABLET, FILM COATED ORAL NIGHTLY
Qty: 90 TABLET | Refills: 3 | Status: SHIPPED | OUTPATIENT
Start: 2024-12-09

## 2024-12-09 NOTE — TELEPHONE ENCOUNTER
Received fax from pharmacy requesting refill on pts medication(s). Pt was last seen in office on 11/27/2024  and has a follow up scheduled for Visit date not found. Will send request to  Gene Wallace  for authorization.     Requested Prescriptions     Pending Prescriptions Disp Refills    atorvastatin (LIPITOR) 20 MG tablet [Pharmacy Med Name: ATORVASTATIN 20 MG TABLET] 90 tablet 3     Sig: TAKE 1 TABLET BY MOUTH EVERY DAY AT NIGHT

## 2025-06-30 ENCOUNTER — OFFICE VISIT (OUTPATIENT)
Age: 40
End: 2025-06-30
Payer: COMMERCIAL

## 2025-06-30 VITALS
DIASTOLIC BLOOD PRESSURE: 78 MMHG | HEIGHT: 72 IN | WEIGHT: 227 LBS | HEART RATE: 81 BPM | TEMPERATURE: 97.2 F | SYSTOLIC BLOOD PRESSURE: 130 MMHG | OXYGEN SATURATION: 98 % | BODY MASS INDEX: 30.75 KG/M2

## 2025-06-30 DIAGNOSIS — L25.5 RHUS DERMATITIS: Primary | ICD-10-CM

## 2025-06-30 DIAGNOSIS — E78.2 MIXED HYPERLIPIDEMIA: ICD-10-CM

## 2025-06-30 PROCEDURE — 96372 THER/PROPH/DIAG INJ SC/IM: CPT | Performed by: NURSE PRACTITIONER

## 2025-06-30 PROCEDURE — 99213 OFFICE O/P EST LOW 20 MIN: CPT | Performed by: NURSE PRACTITIONER

## 2025-06-30 RX ORDER — DEXAMETHASONE SODIUM PHOSPHATE 10 MG/ML
4 INJECTION, SOLUTION INTRA-ARTICULAR; INTRALESIONAL; INTRAMUSCULAR; INTRAVENOUS; SOFT TISSUE ONCE
Status: COMPLETED | OUTPATIENT
Start: 2025-06-30 | End: 2025-06-30

## 2025-06-30 RX ORDER — TRIAMCINOLONE ACETONIDE 40 MG/ML
40 INJECTION, SUSPENSION INTRA-ARTICULAR; INTRAMUSCULAR ONCE
Status: COMPLETED | OUTPATIENT
Start: 2025-06-30 | End: 2025-06-30

## 2025-06-30 RX ADMIN — TRIAMCINOLONE ACETONIDE 40 MG: 40 INJECTION, SUSPENSION INTRA-ARTICULAR; INTRAMUSCULAR at 15:48

## 2025-06-30 RX ADMIN — DEXAMETHASONE SODIUM PHOSPHATE 4 MG: 10 INJECTION, SOLUTION INTRA-ARTICULAR; INTRALESIONAL; INTRAMUSCULAR; INTRAVENOUS; SOFT TISSUE at 15:50

## 2025-06-30 SDOH — ECONOMIC STABILITY: FOOD INSECURITY: WITHIN THE PAST 12 MONTHS, THE FOOD YOU BOUGHT JUST DIDN'T LAST AND YOU DIDN'T HAVE MONEY TO GET MORE.: NEVER TRUE

## 2025-06-30 SDOH — ECONOMIC STABILITY: FOOD INSECURITY: WITHIN THE PAST 12 MONTHS, YOU WORRIED THAT YOUR FOOD WOULD RUN OUT BEFORE YOU GOT MONEY TO BUY MORE.: NEVER TRUE

## 2025-06-30 ASSESSMENT — PATIENT HEALTH QUESTIONNAIRE - PHQ9
2. FEELING DOWN, DEPRESSED OR HOPELESS: NOT AT ALL
SUM OF ALL RESPONSES TO PHQ QUESTIONS 1-9: 0
1. LITTLE INTEREST OR PLEASURE IN DOING THINGS: NOT AT ALL
SUM OF ALL RESPONSES TO PHQ QUESTIONS 1-9: 0

## 2025-06-30 ASSESSMENT — ENCOUNTER SYMPTOMS
COUGH: 0
RHINORRHEA: 0
SORE THROAT: 0
TROUBLE SWALLOWING: 0
VOMITING: 0
DIARRHEA: 0
NAUSEA: 0
CONSTIPATION: 0
ABDOMINAL PAIN: 0
SHORTNESS OF BREATH: 0

## 2025-06-30 NOTE — PROGRESS NOTES
After obtaining consent and per order of JOSE HIDALGO, gave patient Kenalog 40mg/Dexamethasone 4mg injection in Right upper quad. gluteus, patient tolerated well.  Medication was not supplied by the patient.

## 2025-06-30 NOTE — PROGRESS NOTES
Ralf Mane (:  1985) is a 39 y.o. male, Established patient, here for evaluation of the following chief complaint(s):  Poison Ivy (X 1 week. No help with calamine/ )         Assessment & Plan      Results        ICD-10-CM    1. Rhus dermatitis  L25.5 triamcinolone acetonide (KENALOG-40) injection 40 mg     dexAMETHasone (DECADRON) injection 4 mg      2. Mixed hyperlipidemia  E78.2         No follow-ups on file.       Subjective   History of Present Illness      Prior to Visit Medications    Medication Sig Taking? Authorizing Provider   atorvastatin (LIPITOR) 20 MG tablet TAKE 1 TABLET BY MOUTH EVERY DAY AT NIGHT Yes Milan Wallace APRN   pantoprazole (PROTONIX) 40 MG tablet TAKE 1 TABLET BY MOUTH EVERY DAY Yes SANTOS Hurley,         No Known Allergies    History reviewed. No pertinent past medical history.    History reviewed. No pertinent surgical history.    Family History   Problem Relation Age of Onset    Colon Cancer Paternal Grandmother          Review of Systems   Constitutional:  Negative for activity change, appetite change, fatigue, fever and unexpected weight change.   HENT:  Negative for ear pain, rhinorrhea, sore throat and trouble swallowing.    Eyes:  Negative for visual disturbance.   Respiratory:  Negative for cough and shortness of breath.    Cardiovascular:  Negative for chest pain, palpitations and leg swelling.   Gastrointestinal:  Negative for abdominal pain, constipation, diarrhea, nausea and vomiting.   Genitourinary:  Negative for flank pain.   Musculoskeletal:  Negative for arthralgias, myalgias, neck pain and neck stiffness.   Skin:  Positive for rash.   Neurological:  Negative for headaches.   Psychiatric/Behavioral:  Negative for decreased concentration and sleep disturbance. The patient is not nervous/anxious.           Objective   Blood pressure 130/78, pulse 81, temperature 97.2 °F (36.2 °C), temperature source Temporal, height 1.829 m (6'),